# Patient Record
Sex: FEMALE | Race: OTHER | HISPANIC OR LATINO | Employment: FULL TIME | URBAN - METROPOLITAN AREA
[De-identification: names, ages, dates, MRNs, and addresses within clinical notes are randomized per-mention and may not be internally consistent; named-entity substitution may affect disease eponyms.]

---

## 2023-05-02 ENCOUNTER — APPOINTMENT (OUTPATIENT)
Dept: LAB | Facility: CLINIC | Age: 28
End: 2023-05-02

## 2023-05-02 DIAGNOSIS — Z02.1 PRE-EMPLOYMENT HEALTH SCREENING EXAMINATION: ICD-10-CM

## 2023-05-02 LAB
MEV IGG SER QL IA: NORMAL
MUV IGG SER QL IA: NORMAL
RUBV IGG SERPL IA-ACNC: 121.1 IU/ML
VZV IGG SER QL IA: NORMAL

## 2023-05-03 LAB
GAMMA INTERFERON BACKGROUND BLD IA-ACNC: 0.05 IU/ML
M TB IFN-G BLD-IMP: NEGATIVE
M TB IFN-G CD4+ BCKGRND COR BLD-ACNC: -0.01 IU/ML
M TB IFN-G CD4+ BCKGRND COR BLD-ACNC: -0.01 IU/ML
MITOGEN IGNF BCKGRD COR BLD-ACNC: 4.98 IU/ML

## 2023-06-16 PROBLEM — Z00.00 ANNUAL PHYSICAL EXAM: Status: ACTIVE | Noted: 2023-06-16

## 2023-06-16 PROBLEM — E01.0 THYROMEGALY: Status: ACTIVE | Noted: 2023-06-16

## 2023-06-16 PROBLEM — R94.6 ABNORMAL THYROID EXAM: Status: ACTIVE | Noted: 2023-06-16

## 2023-06-16 PROBLEM — I83.93 VARICOSE VEINS OF BOTH LOWER EXTREMITIES: Status: ACTIVE | Noted: 2023-06-16

## 2023-06-16 PROBLEM — E28.2 POLYCYSTIC OVARY: Status: ACTIVE | Noted: 2023-06-16

## 2023-06-16 PROBLEM — E66.811 CLASS 1 OBESITY DUE TO EXCESS CALORIES WITHOUT SERIOUS COMORBIDITY WITH BODY MASS INDEX (BMI) OF 32.0 TO 32.9 IN ADULT: Status: ACTIVE | Noted: 2023-06-16

## 2023-06-16 PROBLEM — E66.09 CLASS 1 OBESITY DUE TO EXCESS CALORIES WITHOUT SERIOUS COMORBIDITY WITH BODY MASS INDEX (BMI) OF 32.0 TO 32.9 IN ADULT: Status: ACTIVE | Noted: 2023-06-16

## 2023-06-16 PROBLEM — I83.813 VARICOSE VEINS OF BOTH LOWER EXTREMITIES WITH PAIN: Status: ACTIVE | Noted: 2023-06-16

## 2023-06-16 PROBLEM — J06.9 ACUTE URI: Status: ACTIVE | Noted: 2023-06-16

## 2023-08-15 PROBLEM — J06.9 ACUTE URI: Status: RESOLVED | Noted: 2023-06-16 | Resolved: 2023-08-15

## 2023-12-31 ENCOUNTER — APPOINTMENT (EMERGENCY)
Dept: RADIOLOGY | Facility: HOSPITAL | Age: 28
End: 2023-12-31
Payer: COMMERCIAL

## 2023-12-31 ENCOUNTER — HOSPITAL ENCOUNTER (EMERGENCY)
Facility: HOSPITAL | Age: 28
Discharge: HOME | End: 2023-12-31
Attending: INTERNAL MEDICINE | Admitting: INTERNAL MEDICINE
Payer: COMMERCIAL

## 2023-12-31 VITALS
TEMPERATURE: 98.1 F | RESPIRATION RATE: 18 BRPM | SYSTOLIC BLOOD PRESSURE: 136 MMHG | OXYGEN SATURATION: 99 % | DIASTOLIC BLOOD PRESSURE: 59 MMHG | HEART RATE: 86 BPM

## 2023-12-31 DIAGNOSIS — R07.9 CHEST PAIN, UNSPECIFIED TYPE: Primary | ICD-10-CM

## 2023-12-31 DIAGNOSIS — F41.9 ANXIETY: ICD-10-CM

## 2023-12-31 LAB
ALBUMIN SERPL BCP-MCNC: 4.4 G/DL (ref 3.5–5)
ALP SERPL-CCNC: 77 U/L (ref 34–104)
ALT SERPL W P-5'-P-CCNC: 13 U/L (ref 7–52)
ANION GAP SERPL CALCULATED.3IONS-SCNC: 9 MMOL/L
AST SERPL W P-5'-P-CCNC: 14 U/L (ref 13–39)
ATRIAL RATE: 111 BPM
ATRIAL RATE: 80 BPM
BASOPHILS # BLD AUTO: 0.02 THOUSANDS/ÂΜL (ref 0–0.1)
BASOPHILS NFR BLD AUTO: 0 % (ref 0–1)
BILIRUB SERPL-MCNC: 0.73 MG/DL (ref 0.2–1)
BUN SERPL-MCNC: 12 MG/DL (ref 5–25)
CALCIUM SERPL-MCNC: 9.1 MG/DL (ref 8.4–10.2)
CARDIAC TROPONIN I PNL SERPL HS: <2 NG/L (ref 8–18)
CHLORIDE SERPL-SCNC: 103 MMOL/L (ref 96–108)
CO2 SERPL-SCNC: 25 MMOL/L (ref 21–32)
CREAT SERPL-MCNC: 0.74 MG/DL (ref 0.6–1.3)
EOSINOPHIL # BLD AUTO: 0.07 THOUSAND/ÂΜL (ref 0–0.61)
EOSINOPHIL NFR BLD AUTO: 1 % (ref 0–6)
ERYTHROCYTE [DISTWIDTH] IN BLOOD BY AUTOMATED COUNT: 12.8 % (ref 11.6–15.1)
GFR SERPL CREATININE-BSD FRML MDRD: 110 ML/MIN/1.73SQ M
GLUCOSE SERPL-MCNC: 81 MG/DL (ref 65–140)
HCT VFR BLD AUTO: 45.4 % (ref 34.8–46.1)
HGB BLD-MCNC: 15.1 G/DL (ref 11.5–15.4)
IMM GRANULOCYTES # BLD AUTO: 0.07 THOUSAND/UL (ref 0–0.2)
IMM GRANULOCYTES NFR BLD AUTO: 1 % (ref 0–2)
LYMPHOCYTES # BLD AUTO: 1.49 THOUSANDS/ÂΜL (ref 0.6–4.47)
LYMPHOCYTES NFR BLD AUTO: 15 % (ref 14–44)
MCH RBC QN AUTO: 28.1 PG (ref 26.8–34.3)
MCHC RBC AUTO-ENTMCNC: 33.3 G/DL (ref 31.4–37.4)
MCV RBC AUTO: 85 FL (ref 82–98)
MONOCYTES # BLD AUTO: 0.93 THOUSAND/ÂΜL (ref 0.17–1.22)
MONOCYTES NFR BLD AUTO: 9 % (ref 4–12)
NEUTROPHILS # BLD AUTO: 7.7 THOUSANDS/ÂΜL (ref 1.85–7.62)
NEUTS SEG NFR BLD AUTO: 74 % (ref 43–75)
NRBC BLD AUTO-RTO: 0 /100 WBCS
P AXIS: 78 DEGREES
P AXIS: 80 DEGREES
PLATELET # BLD AUTO: 329 THOUSANDS/UL (ref 149–390)
PMV BLD AUTO: 9.3 FL (ref 8.9–12.7)
POTASSIUM SERPL-SCNC: 3.2 MMOL/L (ref 3.5–5.3)
PR INTERVAL: 122 MS
PR INTERVAL: 124 MS
PROT SERPL-MCNC: 7.4 G/DL (ref 6.4–8.4)
QRS AXIS: 86 DEGREES
QRS AXIS: 90 DEGREES
QRSD INTERVAL: 84 MS
QRSD INTERVAL: 88 MS
QT INTERVAL: 302 MS
QT INTERVAL: 352 MS
QTC INTERVAL: 405 MS
QTC INTERVAL: 410 MS
RBC # BLD AUTO: 5.37 MILLION/UL (ref 3.81–5.12)
SODIUM SERPL-SCNC: 137 MMOL/L (ref 135–147)
T WAVE AXIS: 31 DEGREES
T WAVE AXIS: 65 DEGREES
T4 FREE SERPL-MCNC: 0.91 NG/DL (ref 0.61–1.12)
TSH SERPL DL<=0.05 MIU/L-ACNC: 1.05 UIU/ML (ref 0.45–4.5)
VENTRICULAR RATE: 111 BPM
VENTRICULAR RATE: 80 BPM
WBC # BLD AUTO: 10.28 THOUSAND/UL (ref 4.31–10.16)

## 2023-12-31 PROCEDURE — 84439 ASSAY OF FREE THYROXINE: CPT | Performed by: INTERNAL MEDICINE

## 2023-12-31 PROCEDURE — 80053 COMPREHEN METABOLIC PANEL: CPT | Performed by: INTERNAL MEDICINE

## 2023-12-31 PROCEDURE — 99285 EMERGENCY DEPT VISIT HI MDM: CPT | Performed by: INTERNAL MEDICINE

## 2023-12-31 PROCEDURE — 99285 EMERGENCY DEPT VISIT HI MDM: CPT

## 2023-12-31 PROCEDURE — 84443 ASSAY THYROID STIM HORMONE: CPT | Performed by: INTERNAL MEDICINE

## 2023-12-31 PROCEDURE — 85025 COMPLETE CBC W/AUTO DIFF WBC: CPT | Performed by: INTERNAL MEDICINE

## 2023-12-31 PROCEDURE — 84484 ASSAY OF TROPONIN QUANT: CPT | Performed by: INTERNAL MEDICINE

## 2023-12-31 PROCEDURE — 36415 COLL VENOUS BLD VENIPUNCTURE: CPT | Performed by: INTERNAL MEDICINE

## 2023-12-31 PROCEDURE — 71045 X-RAY EXAM CHEST 1 VIEW: CPT

## 2023-12-31 PROCEDURE — 93005 ELECTROCARDIOGRAM TRACING: CPT

## 2023-12-31 RX ORDER — POTASSIUM CHLORIDE 20 MEQ/1
40 TABLET, EXTENDED RELEASE ORAL ONCE
Status: COMPLETED | OUTPATIENT
Start: 2023-12-31 | End: 2023-12-31

## 2023-12-31 RX ORDER — ALPRAZOLAM 0.5 MG/1
0.5 TABLET ORAL ONCE
Status: COMPLETED | OUTPATIENT
Start: 2023-12-31 | End: 2023-12-31

## 2023-12-31 RX ADMIN — POTASSIUM CHLORIDE 40 MEQ: 1500 TABLET, EXTENDED RELEASE ORAL at 10:08

## 2023-12-31 RX ADMIN — ALPRAZOLAM 0.5 MG: 0.5 TABLET ORAL at 10:08

## 2023-12-31 NOTE — DISCHARGE INSTRUCTIONS
Follow up with [facility].  Labs Reviewed   CBC AND DIFFERENTIAL - Abnormal       Result Value Ref Range Status    WBC 10.28 (*) 4.31 - 10.16 Thousand/uL Final    RBC 5.37 (*) 3.81 - 5.12 Million/uL Final    Hemoglobin 15.1  11.5 - 15.4 g/dL Final    Hematocrit 45.4  34.8 - 46.1 % Final    MCV 85  82 - 98 fL Final    MCH 28.1  26.8 - 34.3 pg Final    MCHC 33.3  31.4 - 37.4 g/dL Final    RDW 12.8  11.6 - 15.1 % Final    MPV 9.3  8.9 - 12.7 fL Final    Platelets 329  149 - 390 Thousands/uL Final    nRBC 0  /100 WBCs Final    Neutrophils Relative 74  43 - 75 % Final    Immat GRANS % 1  0 - 2 % Final    Lymphocytes Relative 15  14 - 44 % Final    Monocytes Relative 9  4 - 12 % Final    Eosinophils Relative 1  0 - 6 % Final    Basophils Relative 0  0 - 1 % Final    Neutrophils Absolute 7.70 (*) 1.85 - 7.62 Thousands/µL Final    Immature Grans Absolute 0.07  0.00 - 0.20 Thousand/uL Final    Lymphocytes Absolute 1.49  0.60 - 4.47 Thousands/µL Final    Monocytes Absolute 0.93  0.17 - 1.22 Thousand/µL Final    Eosinophils Absolute 0.07  0.00 - 0.61 Thousand/µL Final    Basophils Absolute 0.02  0.00 - 0.10 Thousands/µL Final   COMPREHENSIVE METABOLIC PANEL - Abnormal    Sodium 137  135 - 147 mmol/L Final    Potassium 3.2 (*) 3.5 - 5.3 mmol/L Final    Chloride 103  96 - 108 mmol/L Final    CO2 25  21 - 32 mmol/L Final    ANION GAP 9  mmol/L Final    BUN 12  5 - 25 mg/dL Final    Creatinine 0.74  0.60 - 1.30 mg/dL Final    Comment: Standardized to IDMS reference method    Glucose 81  65 - 140 mg/dL Final    Comment: If the patient is fasting, the ADA then defines impaired fasting glucose as > 100 mg/dL and diabetes as > or equal to 123 mg/dL.    Calcium 9.1  8.4 - 10.2 mg/dL Final    AST 14  13 - 39 U/L Final    ALT 13  7 - 52 U/L Final    Comment: Specimen collection should occur prior to Sulfasalazine administration due to the potential for falsely depressed results.     Alkaline Phosphatase 77  34 - 104 U/L  Final    Total Protein 7.4  6.4 - 8.4 g/dL Final    Albumin 4.4  3.5 - 5.0 g/dL Final    Total Bilirubin 0.73  0.20 - 1.00 mg/dL Final    Comment: Use of this assay is not recommended for patients undergoing treatment with eltrombopag due to the potential for falsely elevated results.  N-acetyl-p-benzoquinone imine (metabolite of Acetaminophen) will generate erroneously low results in samples for patients that have taken an overdose of Acetaminophen.    eGFR 110  ml/min/1.73sq m Final    Narrative:     National Kidney Disease Foundation guidelines for Chronic Kidney Disease (CKD):     Stage 1 with normal or high GFR (GFR > 90 mL/min/1.73 square meters)    Stage 2 Mild CKD (GFR = 60-89 mL/min/1.73 square meters)    Stage 3A Moderate CKD (GFR = 45-59 mL/min/1.73 square meters)    Stage 3B Moderate CKD (GFR = 30-44 mL/min/1.73 square meters)    Stage 4 Severe CKD (GFR = 15-29 mL/min/1.73 square meters)    Stage 5 End Stage CKD (GFR <15 mL/min/1.73 square meters)  Note: GFR calculation is accurate only with a steady state creatinine   HIGH SENSITIVITY TROPONIN I RANDOM - Abnormal    HS TnI random <2 (*) 8 - 18 ng/L Final    Comment:                                              Initial (time 0) result  If >=50 ng/L, Myocardial injury suggested ;  Type of myocardial injury and treatment strategy  to be determined.  If 5-49 ng/L, a delta result at 2 hours and or 4 hours will be needed to further evaluate.  If <4 ng/L, and chest pain has been >3 hours since onset, patient may qualify for discharge based on the HEART score in the ED.  If <5 ng/L and <3hours since onset of chest pain, a delta result at 2 hours will be needed to further evaluate.    HS Troponin 99th Percentile URL of a Health Population=12 ng/L with a 95% Confidence Interval of 8-18 ng/L.    Second Troponin (time 2 hours)  If calculated delta >= 20 ng/L,  Myocardial injury suggested ; Type of myocardial injury and treatment strategy to be determined.  If 5-49  ng/L and the calculated delta is 5-19 ng/L, consult medical service for evaluation.  Continue evaluation for ischemia on ecg and other possible etiology and repeat hs troponin at 4 hours.  If delta is <5 ng/L at 2 hours, consider discharge based on risk stratification via the HEART score (if in ED), or HANSEL risk score in IP/Observation.    HS Troponin 99th Percentile URL of a Health Population=12 ng/L with a 95% Confidence Interval of 8-18 ng/L.   TSH, 3RD GENERATION - Normal    TSH 3RD GENERATON 1.049  0.450 - 4.500 uIU/mL Final    Comment: The recommended reference ranges for TSH during pregnancy are as follows:   First trimester 0.100 to 2.500 uIU/mL   Second trimester  0.200 to 3.000 uIU/mL   Third trimester 0.300 to 3.000 uIU/m    Note: Normal ranges may not apply to patients who are transgender, non-binary, or whose legal sex, sex at birth, and gender identity differ.  Adult TSH (3rd generation) reference range follows the recommended guidelines of the American Thyroid Association, January, 2020.   T4, FREE     XR chest portable   ED Interpretation   CXR; NO acute cardiopulmonary findings

## 2023-12-31 NOTE — ED PROVIDER NOTES
History  Chief Complaint   Patient presents with    Chest Pain     Pt reports chest pain and back pain starting few days ago, denies cardiac or resp hx.      This is a 28 years old came for having chest pain.  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  Patient stated that the pain increases when she takes deep breaths or when she pressed on it.  Patient stated that she has this chest pain for almost 8 days.  Patient denies any nausea vomiting.  Patient denies being pregnant and she has IUD.  Patient stated that she is anxious and she needed something to calm her down.  Patient was [place] that she has anxiety and panic attack.  Patient does not take any medication.  Patient on the monitor she is tachycardic.  Patient stated that most of his family having history of hypothyroidism.  Patient went to a specialist in [facility] who told her that she has hypothyroidism.  Patient does not take any medication for it.  Patient also consulted with endocrinologist in [place] and he told her that he does not think that she has hypothyroidism and he ordered blood work and imaging but patient never went.  Patient denies losing weight.  Patient is anxious at the ER.  Patient has no medical history and she is not a smoker.        None       History reviewed. No pertinent past medical history.    History reviewed. No pertinent surgical history.    History reviewed. No pertinent family history.  I have reviewed and agree with the history as documented.    E-Cigarette/Vaping     E-Cigarette/Vaping Substances     Social History     Tobacco Use    Smoking status: Never    Smokeless tobacco: Never   Substance Use Topics    Alcohol use: Never    Drug use: Never       Review of Systems   Constitutional:  Negative for fatigue and fever.   HENT:  Negative for congestion, sinus pressure, sinus pain, sneezing, sore throat and tinnitus.    Respiratory:  Negative for cough and shortness of  breath.    Cardiovascular:  Positive for chest pain. Negative for palpitations.   Gastrointestinal:  Negative for abdominal pain, diarrhea, nausea and vomiting.   Genitourinary:  Negative for difficulty urinating, dysuria, flank pain and frequency.   Musculoskeletal:  Negative for back pain, myalgias, neck pain and neck stiffness.   Skin:  Negative for color change, pallor and rash.   Neurological:  Negative for dizziness, light-headedness and headaches.   Psychiatric/Behavioral:  Negative for agitation and behavioral problems.        Physical Exam  Physical Exam  Vitals and nursing note reviewed.   Constitutional:       General: She is not in acute distress.     Appearance: She is well-developed. She is not ill-appearing, toxic-appearing or diaphoretic.   HENT:      Head: Normocephalic and atraumatic.      Mouth/Throat:      Pharynx: No oropharyngeal exudate.   Eyes:      Extraocular Movements: Extraocular movements intact.      Pupils: Pupils are equal, round, and reactive to light.   Neck:      Thyroid: Thyromegaly present.      Vascular: No hepatojugular reflux or JVD.      Trachea: No tracheal deviation.   Cardiovascular:      Rate and Rhythm: Normal rate and regular rhythm.      Pulses:           Carotid pulses are 2+ on the right side and 2+ on the left side.       Radial pulses are 2+ on the right side and 2+ on the left side.        Dorsalis pedis pulses are 2+ on the right side and 2+ on the left side.        Posterior tibial pulses are 2+ on the right side and 2+ on the left side.      Heart sounds: Normal heart sounds. Heart sounds not distant. No murmur heard.     No systolic murmur is present.      No friction rub. No gallop. No S3 or S4 sounds.   Pulmonary:      Effort: Pulmonary effort is normal. No tachypnea, accessory muscle usage or respiratory distress.      Breath sounds: Normal breath sounds. No stridor. No decreased breath sounds, wheezing, rhonchi or rales.   Chest:      Chest wall: No mass,  deformity, tenderness, crepitus or edema. There is no dullness to percussion.   Abdominal:      General: Bowel sounds are normal. There is no abdominal bruit.      Palpations: Abdomen is soft. There is no fluid wave, hepatomegaly, splenomegaly or mass.      Tenderness: There is no abdominal tenderness. There is no guarding or rebound.   Musculoskeletal:         General: No tenderness or deformity. Normal range of motion.      Cervical back: Normal range of motion and neck supple.      Right lower leg: No tenderness. No edema.      Left lower leg: No tenderness. No edema.   Lymphadenopathy:      Cervical: No cervical adenopathy.   Skin:     General: Skin is warm and dry.      Capillary Refill: Capillary refill takes less than 2 seconds.      Coloration: Skin is not cyanotic or pale.      Findings: No ecchymosis, erythema or rash.      Nails: There is no clubbing.   Neurological:      Mental Status: She is alert and oriented to person, place, and time.   Psychiatric:         Behavior: Behavior normal.         Vital Signs  ED Triage Vitals   Temperature Pulse Respirations Blood Pressure SpO2   12/31/23 0817 12/31/23 1030 12/31/23 0816 12/31/23 0817 12/31/23 1030   98.1 °F (36.7 °C) 86 16 140/68 99 %      Temp Source Heart Rate Source Patient Position - Orthostatic VS BP Location FiO2 (%)   12/31/23 0817 12/31/23 1030 12/31/23 1030 12/31/23 1030 --   Oral Monitor Sitting Right arm       Pain Score       12/31/23 1030       1           Vitals:    12/31/23 0817 12/31/23 1030   BP: 140/68 136/59   Pulse:  86   Patient Position - Orthostatic VS:  Sitting         Visual Acuity      ED Medications  Medications   ALPRAZolam (XANAX) tablet 0.5 mg (0.5 mg Oral Given 12/31/23 1008)   potassium chloride (K-DUR,KLOR-CON) CR tablet 40 mEq (40 mEq Oral Given 12/31/23 1008)       Diagnostic Studies  Results Reviewed       Procedure Component Value Units Date/Time    T4, free [[id number]]  (Normal) Collected: 12/31/23 0854    Lab  "Status: Final result Specimen: Blood from Arm, Left Updated: 12/31/23 1627     Free T4 0.91 ng/dL     Narrative:        \"Therapeutic range for patients medicated with thyroid disorders: 0.61-1.24 ng/dL.\"    TSH [[id number]]  (Normal) Collected: 12/31/23 0854    Lab Status: Final result Specimen: Blood from Arm, Left Updated: 12/31/23 0939     TSH 3RD GENERATON 1.049 uIU/mL     High Sensitivity Troponin I Random [[id number]]  (Abnormal) Collected: 12/31/23 0854    Lab Status: Final result Specimen: Blood from Arm, Left Updated: 12/31/23 0931     HS TnI random <2 ng/L     Comprehensive metabolic panel [[id number]]  (Abnormal) Collected: 12/31/23 0854    Lab Status: Final result Specimen: Blood from Arm, Left Updated: 12/31/23 0923     Sodium 137 mmol/L      Potassium 3.2 mmol/L      Chloride 103 mmol/L      CO2 25 mmol/L      ANION GAP 9 mmol/L      BUN 12 mg/dL      Creatinine 0.74 mg/dL      Glucose 81 mg/dL      Calcium 9.1 mg/dL      AST 14 U/L      ALT 13 U/L      Alkaline Phosphatase 77 U/L      Total Protein 7.4 g/dL      Albumin 4.4 g/dL      Total Bilirubin 0.73 mg/dL      eGFR 110 ml/min/1.73sq m     Narrative:      National Kidney Disease Foundation guidelines for Chronic Kidney Disease (CKD):     Stage 1 with normal or high GFR (GFR > 90 mL/min/1.73 square meters)    Stage 2 Mild CKD (GFR = 60-89 mL/min/1.73 square meters)    Stage 3A Moderate CKD (GFR = 45-59 mL/min/1.73 square meters)    Stage 3B Moderate CKD (GFR = 30-44 mL/min/1.73 square meters)    Stage 4 Severe CKD (GFR = 15-29 mL/min/1.73 square meters)    Stage 5 End Stage CKD (GFR <15 mL/min/1.73 square meters)  Note: GFR calculation is accurate only with a steady state creatinine    CBC and differential [[id number]]  (Abnormal) Collected: 12/31/23 0854    Lab Status: Final result Specimen: Blood from Arm, Left Updated: 12/31/23 0902     WBC 10.28 Thousand/uL      RBC 5.37 Million/uL      Hemoglobin 15.1 g/dL      Hematocrit 45.4 %      MCV 85 fL     "  MCH 28.1 pg      MCHC 33.3 g/dL      RDW 12.8 %      MPV 9.3 fL      Platelets 329 Thousands/uL      nRBC 0 /100 WBCs      Neutrophils Relative 74 %      Immat GRANS % 1 %      Lymphocytes Relative 15 %      Monocytes Relative 9 %      Eosinophils Relative 1 %      Basophils Relative 0 %      Neutrophils Absolute 7.70 Thousands/µL      Immature Grans Absolute 0.07 Thousand/uL      Lymphocytes Absolute 1.49 Thousands/µL      Monocytes Absolute 0.93 Thousand/µL      Eosinophils Absolute 0.07 Thousand/µL      Basophils Absolute 0.02 Thousands/µL                    XR chest portable   ED Interpretation by Darius Johnson MD (12/31 0930)   CXR; NO acute cardiopulmonary findings      Final Result by Marco Antonio Sofia MD (01/01 0950)      No acute cardiopulmonary disease.                  Workstation performed: [id number]                    Procedures  ECG 12 Lead Documentation Only    Date/Time: 12/31/2023 9:12 AM    Performed by: Darius Johnson MD  Authorized by: Darius Johnson MD    Indications / Diagnosis:  Chest pain  ECG reviewed by me, the ED Provider: yes    Patient location:  ED and bedside  Previous ECG:     Previous ECG:  Unavailable  Interpretation:     Interpretation: normal    Rate:     ECG rate:  111    ECG rate assessment: tachycardic    Rhythm:     Rhythm: sinus tachycardia    Ectopy:     Ectopy: none    QRS:     QRS axis:  Normal    QRS intervals:  Normal  Conduction:     Conduction: normal    ST segments:     ST segments:  Abnormal    Elevation:  V3, V4, V5 and V6  T waves:     T waves: non-specific    Other findings:     Other findings: RUBEN    ECG 12 Lead Documentation Only    Date/Time: 12/31/2023 10:10 AM    Performed by: Darius Johnson MD  Authorized by: Darius Johnson MD    Indications / Diagnosis:  CHEST PAIN  ECG reviewed by me, the ED Provider: yes    Patient location:  ED  Previous ECG:     Previous ECG:  Compared to current    Similarity:  Changes noted    Comparison to  cardiac monitor: Yes    Interpretation:     Interpretation: abnormal    Rate:     ECG rate:  80    ECG rate assessment: normal    Rhythm:     Rhythm: sinus rhythm    Ectopy:     Ectopy: none    QRS:     QRS axis:  Normal    QRS intervals:  Normal  Conduction:     Conduction: normal    ST segments:     ST segments:  Normal  T waves:     T waves: normal    Comments:      NORMAL EKG           ED Course             HEART Risk Score      Flowsheet Row Most Recent Value   Heart Score Risk Calculator    History 0 Filed at: 12/31/2023 1014   ECG 0 Filed at: 12/31/2023 1014   Age 0 Filed at: 12/31/2023 1014   Risk Factors 0 Filed at: 12/31/2023 1014   Troponin 0 Filed at: 12/31/2023 1014   HEART Score 0 Filed at: 12/31/2023 1014                                        Medical Decision Making  This is a 28 years old came for having chest pain.  Patient has this pain for almost 3 days.  Today the pain increased while she was walking.  Patient has no SOB, nausea vomiting diaphoresis.  Patient has no history of CAD.  Patient stated that she felt anxious and she needed to calm down.  Patient has family history of hypothyroidism and at the ER patient is tachycardic.  Patient was told by endocrinologist to do blood work and imaging studies but she never went.  Patient denies smoking.  Patient denies taking any medications , now.  Patient was on metoprolol for sometimes but she stopped it as per  Endocrinologist.  Patient stated that she has history of anxiety and panic attack in the past.  Physical exam is pertinent for having enlarged thyroid.  No other pertinent findings on the physical exam.  EKG shows sinus tachycardia with rate 111 and EEG, ST depression in V3, 4, 5, 6. Pt received xanax and felt better. Repeat EKG, NSR rate80/min, no ischemic changes, normal EKG. LABS; CBC WNL, CMP;  K 3.2 TSH 1.049.  heart score is 0.  Differential diagnoses include but not limited to; anxiety, panic attack, costochondritis, pleurisy,  pericarditis, angina,  .     Amount and/or Complexity of Data Reviewed  Labs: ordered.     Details: CBC;WNL, CMP  K 3.2 , TSH 1.049  Radiology: ordered and independent interpretation performed.     Details: CXR; no acute cardiopulmonary findings.  ECG/medicine tests: ordered and independent interpretation performed.     Details: EKG; sinus tachycardia rate 111/min, RAD, ST depression in V3, 4, 5, 6.  Repeat EKG; NSR rate 80/min, no ischemic changes, normal EKG    Risk  Prescription drug management.             Disposition  Final diagnoses:   Chest pain, unspecified type   Anxiety     Time reflects when diagnosis was documented in both MDM as applicable and the Disposition within this note       Time User Action Codes Description Comment    12/31/2023 10:16 AM Darius Johnson [R07.9] Chest pain, unspecified type     12/31/2023 10:16 AM Darius Johnson [F41.9] Anxiety           ED Disposition       ED Disposition   Discharge    Condition   Stable    Date/Time   Sun Dec 31, 2023 1016    Comment   Deepa Darling discharge to home/self care.                   Follow-up Information       Follow up With Specialties Details Why Contact Info Additional Information    [facility] In 1 week  [address] [facility]  [address]  [facility] [place] [address] [phone] [facility], [address], [place], [address], [phone]            There are no discharge medications for this patient.      No discharge procedures on file.    PDMP Review       None            ED Provider  Electronically Signed by             Darius Johnson MD  01/01/24 [id number]

## 2024-03-01 ENCOUNTER — OFFICE VISIT (OUTPATIENT)
Dept: URGENT CARE | Facility: CLINIC | Age: 29
End: 2024-03-01
Payer: COMMERCIAL

## 2024-03-01 VITALS
SYSTOLIC BLOOD PRESSURE: 129 MMHG | HEIGHT: 65 IN | WEIGHT: 187.39 LBS | OXYGEN SATURATION: 99 % | HEART RATE: 83 BPM | BODY MASS INDEX: 31.22 KG/M2 | TEMPERATURE: 96.2 F | RESPIRATION RATE: 12 BRPM | DIASTOLIC BLOOD PRESSURE: 80 MMHG

## 2024-03-01 DIAGNOSIS — M79.672 LEFT FOOT PAIN: Primary | ICD-10-CM

## 2024-03-01 PROCEDURE — 99213 OFFICE O/P EST LOW 20 MIN: CPT | Performed by: NURSE PRACTITIONER

## 2024-03-01 NOTE — LETTER
March 1, 2024     Patient: Deepa Darling   YOB: 1995   Date of Visit: 3/1/2024       To Whom it May Concern:    Deepa Darling was seen in my clinic on 3/1/2024. She may return to work on 3/2/2024 .    If you have any questions or concerns, please don't hesitate to call.         Sincerely,          KVNG Jacinto        CC: No Recipients

## 2024-03-01 NOTE — PROGRESS NOTES
[facility]        NAME: Deepa Darling is a 28 y.o. female  : 1995    MRN: [id number]  DATE: 2024  TIME: 5:05 PM    Assessment and Plan   Left foot pain [M79.672]  1. Left foot pain              Patient Instructions   If tests have been performed at [facility], our office will contact you with results if changes need to be made to the care plan discussed with you at the visit. You can review your full results on [facility] MyChart.     Ibuprofen as needed for pain   Apply ice 20 min every 2-3 hours  Wear a good supportive shoe     Follow up with PCP in 3-5 days.  Proceed to  ER if symptoms worsen.    Chief Complaint     Chief Complaint   Patient presents with    Foot Pain     Pt reports of right foot pain with out injury occurred yesterday.          History of Present Illness       Patient is a 28 year old female presenting with left foot pain that started yesterday. She states that she wore new shoes to work yesterday when the pain started. Denies injury. Denies swelling, ecchymosis, or erythema. She took Tylenol yesterday.         Review of Systems   Review of Systems   Constitutional:  Negative for activity change, chills and fever.   Respiratory:  Negative for cough and shortness of breath.    Musculoskeletal:  Negative for arthralgias and gait problem.   Skin:  Negative for color change and wound.   Neurological:  Negative for headaches.         Current Medications     No current outpatient medications on file.    Current Allergies     Allergies as of 2024    (No Known Allergies)            The following portions of the patient's history were reviewed and updated as appropriate: allergies, current medications, past family history, past medical history, past social history, past surgical history and problem list.     Past Medical History:   Diagnosis Date    Hypothyroidism        Past Surgical History:   Procedure Laterality Date    WISDOM TOOTH EXTRACTION         Family  "History   Problem Relation Age of Onset    Thyroid disease Mother     Hypertension Father     Hypertension Sister     Heart attack Maternal Grandfather     Thyroid cancer Paternal Grandmother     Cancer Paternal Grandfather         Cancer de prostata    Heart attack Maternal Uncle     Thyroid cancer Paternal Aunt          Medications have been verified.        Objective   /80   Pulse 83   Temp (!) 96.2 °F (35.7 °C)   Resp 12   Ht 5' 5\" (1.651 m)   Wt 85 kg (187 lb 6.3 oz)   SpO2 99%   BMI 31.18 kg/m²        Physical Exam     Physical Exam  Constitutional:       General: She is awake. She is not in acute distress.     Appearance: Normal appearance. She is normal weight.   Cardiovascular:      Rate and Rhythm: Normal rate.   Pulmonary:      Effort: Pulmonary effort is normal.   Musculoskeletal:        Feet:    Neurological:      Mental Status: She is alert.   Psychiatric:         Behavior: Behavior is cooperative.                   "

## 2024-03-01 NOTE — PATIENT INSTRUCTIONS
If tests have been performed at [facility], our office will contact you with results if changes need to be made to the care plan discussed with you at the visit. You can review your full results on [facility] MyChart.     Ibuprofen as needed for pain   Apply ice 20 min every 2-3 hours  Wear a good supportive shoe

## 2024-03-08 PROBLEM — Z01.419 ENCOUNTER FOR ANNUAL ROUTINE GYNECOLOGICAL EXAMINATION: Status: ACTIVE | Noted: 2024-03-08

## 2024-03-08 NOTE — PROGRESS NOTES
Assessment/Plan:  NGE  BCM-Liletta         Pap smear q 3yrs. - done  Cervical Cultures till 25  - done,  along with desired blood work                                                                         RTO 1 yr.                                                                                    SBA monthly                                                                              Exercise 3/ wk                                                                                        Calcium 1,000 mg/d with Vit D                    Depression Screen: Neg                                      Diagnoses and all orders for this visit:    Encounter for annual routine gynecological examination  -     Liquid-based pap, screening    Screening for cervical cancer    Screen for STD (sexually transmitted disease)  -     Chlamydia/GC amplified DNA by PCR  -     Hepatitis B surface antigen; Future  -     Hepatitis C antibody  -     HIV 1/2 AG/AB w Reflex [facility] for 2 yr old and above; Future  -     RPR-Syphilis Screening (Total Syphilis IGG/IGM); Future    IUD (intrauterine device) in place    Other orders  -     levonorgestrel (Liletta, 52 MG,) 20.1 mcg/day IUD; 1 each by Intrauterine route Once every 8 years              Subjective:        Patient ID: Deepa Darling is a 29 y.o. female.    Sandy presents today for a new patient annual visit.  She is without complaints.  She enjoys amenorrhea on Liletta.  She arrived to the [place] in 2021 from [facility].  She is  but they are coexisting until her asylum case is finished.  She plans on .  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        The following portions of the patient's history were reviewed and updated as appropriate: She  has a past medical history of Hypothyroidism.  Patient Active Problem List    Diagnosis Date Noted    IUD (intrauterine device) in place 03/11/2024     Encounter for annual routine gynecological examination 03/08/2024    Annual physical exam 06/16/2023    Varicose veins of both lower extremities with pain 06/16/2023    Class 1 obesity due to excess calories without serious comorbidity with body mass index (BMI) of 32.0 to 32.9 in adult 06/16/2023    Polycystic ovary 06/16/2023    Abnormal thyroid exam 06/16/2023    Thyromegaly 06/16/2023   PMH:  Menarche 11  Dysmenorrhea  Hyperinsulinism 15  G1, P1; SAVD M 2700 gms. complicated by hyperemesis gravidarum  Mirena 5/17  Liletta 4/23  She  has a past surgical history that includes [place] tooth extraction.  Her family history includes Cancer in her paternal grandfather; Heart attack in her maternal grandfather and maternal uncle; Hypertension in her father and sister; Thyroid cancer in her paternal aunt and paternal grandmother; Thyroid disease in her mother.  MGF, MU - MI  F, S - HTN  M, MGM, MA -hypothyroidism  She  reports that she has never smoked. She has never used smokeless tobacco. She reports that she does not drink alcohol and does not use drugs.  SH:   '17.  Moved from [facility] '21.  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  Desires to go to nursing school.  Current Outpatient Medications   Medication Sig Dispense Refill    levonorgestrel (Liletta, 52 MG,) 20.1 mcg/day IUD 1 each by Intrauterine route Once every 8 years       No current facility-administered medications for this visit.     Current Outpatient Medications on File Prior to Visit   Medication Sig    levonorgestrel (Liletta, 52 MG,) 20.1 mcg/day IUD 1 each by Intrauterine route Once every 8 years     No current facility-administered medications on file prior to visit.     She has No Known Allergies..    Review of Systems   Constitutional:  Negative for activity change, appetite change, fatigue and unexpected weight change.   Eyes:  Negative for visual disturbance.   Respiratory:  Negative for cough, chest  "tightness, shortness of breath and wheezing.    Cardiovascular:  Negative for chest pain, palpitations and leg swelling.        Breast: Patient denies tenderness, nipple discharge, masses, or erythema.   Gastrointestinal:  Negative for abdominal distention, abdominal pain, blood in stool, constipation, diarrhea, nausea and vomiting.   Endocrine: Negative for cold intolerance and heat intolerance.   Genitourinary:  Negative for decreased urine volume, difficulty urinating, dysuria, frequency, hematuria, menstrual problem, pelvic pain, urgency, vaginal bleeding, vaginal discharge and vaginal pain.        No incontinence.  Not sexually active   Musculoskeletal:  Negative for arthralgias.   Skin:  Negative for rash.   Neurological:  Negative for weakness, light-headedness, numbness and headaches.   Hematological:  Does not bruise/bleed easily.   Psychiatric/Behavioral:  Negative for agitation, behavioral problems and sleep disturbance. The patient is not nervous/anxious.          Objective:    Vitals:    03/11/24 1418   BP: 110/80   BP Location: Right arm   Patient Position: Sitting   Cuff Size: Standard   Weight: 88.1 kg (194 lb 3.2 oz)   Height: 5' 2.99\" (1.6 m)            Physical Exam  Vitals and nursing note reviewed. Exam conducted with a chaperone present.   Constitutional:       General: She is not in acute distress.     Appearance: Normal appearance. She is well-developed.   HENT:      Head: Normocephalic and atraumatic.   Eyes:      General: No scleral icterus.        Right eye: No discharge.         Left eye: No discharge.      Extraocular Movements: Extraocular movements intact.      Conjunctiva/sclera: Conjunctivae normal.   Neck:      Thyroid: No thyromegaly.      Trachea: No tracheal deviation.   Cardiovascular:      Rate and Rhythm: Normal rate and regular rhythm.      Heart sounds: Normal heart sounds. No murmur heard.  Pulmonary:      Effort: Pulmonary effort is normal. No respiratory distress.      " Breath sounds: Normal breath sounds. No wheezing.   Chest:   Breasts:     Breasts are symmetrical.      Right: No inverted nipple, mass, nipple discharge, skin change or tenderness.      Left: No inverted nipple, mass, nipple discharge, skin change or tenderness.   Abdominal:      General: Bowel sounds are normal. There is no distension.      Palpations: Abdomen is soft. There is no mass.      Tenderness: There is no abdominal tenderness. There is no guarding or rebound.   Genitourinary:     General: Normal vulva.      Labia:         Right: No rash, tenderness or lesion.         Left: No rash, tenderness or lesion.       Vagina: Normal.      Cervix: No cervical motion tenderness or discharge.      Uterus: Not deviated, not enlarged and not tender.       Adnexa:         Right: No mass, tenderness or fullness.          Left: No mass, tenderness or fullness.        Rectum: No external hemorrhoid.      Comments: Urethral meatus within normal limits.  Perineum within normal limits.  Bladder well supported.  IUD string present.  Musculoskeletal:         General: No tenderness. Normal range of motion.      Cervical back: Normal range of motion and neck supple.   Lymphadenopathy:      Cervical: No cervical adenopathy.   Skin:     General: Skin is warm and dry.   Neurological:      Mental Status: She is alert and oriented to person, place, and time.   Psychiatric:         Mood and Affect: Mood normal.         Behavior: Behavior normal.         Thought Content: Thought content normal.         Judgment: Judgment normal.

## 2024-03-11 ENCOUNTER — OFFICE VISIT (OUTPATIENT)
Dept: OBGYN CLINIC | Facility: CLINIC | Age: 29
End: 2024-03-11
Payer: COMMERCIAL

## 2024-03-11 VITALS
BODY MASS INDEX: 34.41 KG/M2 | DIASTOLIC BLOOD PRESSURE: 80 MMHG | HEIGHT: 63 IN | SYSTOLIC BLOOD PRESSURE: 110 MMHG | WEIGHT: 194.2 LBS

## 2024-03-11 DIAGNOSIS — Z12.4 SCREENING FOR CERVICAL CANCER: ICD-10-CM

## 2024-03-11 DIAGNOSIS — Z11.3 SCREEN FOR STD (SEXUALLY TRANSMITTED DISEASE): ICD-10-CM

## 2024-03-11 DIAGNOSIS — Z01.419 ENCOUNTER FOR ANNUAL ROUTINE GYNECOLOGICAL EXAMINATION: Primary | ICD-10-CM

## 2024-03-11 DIAGNOSIS — Z97.5 IUD (INTRAUTERINE DEVICE) IN PLACE: ICD-10-CM

## 2024-03-11 PROCEDURE — 87491 CHLMYD TRACH DNA AMP PROBE: CPT | Performed by: OBSTETRICS & GYNECOLOGY

## 2024-03-11 PROCEDURE — 87591 N.GONORRHOEAE DNA AMP PROB: CPT | Performed by: OBSTETRICS & GYNECOLOGY

## 2024-03-11 PROCEDURE — S0610 ANNUAL GYNECOLOGICAL EXAMINA: HCPCS | Performed by: OBSTETRICS & GYNECOLOGY

## 2024-03-11 PROCEDURE — G0145 SCR C/V CYTO,THINLAYER,RESCR: HCPCS | Performed by: OBSTETRICS & GYNECOLOGY

## 2024-03-11 RX ORDER — LEVONORGESTREL 52 MG/1
1 INTRAUTERINE DEVICE INTRAUTERINE
COMMUNITY

## 2024-03-13 LAB
C TRACH DNA SPEC QL NAA+PROBE: NEGATIVE
N GONORRHOEA DNA SPEC QL NAA+PROBE: NEGATIVE

## 2024-03-16 ENCOUNTER — LAB (OUTPATIENT)
Dept: LAB | Facility: HOSPITAL | Age: 29
End: 2024-03-16
Payer: COMMERCIAL

## 2024-03-16 DIAGNOSIS — Z00.00 ANNUAL PHYSICAL EXAM: ICD-10-CM

## 2024-03-16 DIAGNOSIS — E28.2 POLYCYSTIC OVARY: ICD-10-CM

## 2024-03-16 DIAGNOSIS — Z11.3 SCREEN FOR STD (SEXUALLY TRANSMITTED DISEASE): ICD-10-CM

## 2024-03-16 DIAGNOSIS — R94.6 ABNORMAL THYROID EXAM: ICD-10-CM

## 2024-03-16 LAB
BASOPHILS # BLD AUTO: 0.05 THOUSANDS/ÂΜL (ref 0–0.1)
BASOPHILS NFR BLD AUTO: 1 % (ref 0–1)
EOSINOPHIL # BLD AUTO: 0.13 THOUSAND/ÂΜL (ref 0–0.61)
EOSINOPHIL NFR BLD AUTO: 2 % (ref 0–6)
ERYTHROCYTE [DISTWIDTH] IN BLOOD BY AUTOMATED COUNT: 13 % (ref 11.6–15.1)
HCT VFR BLD AUTO: 46.3 % (ref 34.8–46.1)
HGB BLD-MCNC: 14.6 G/DL (ref 11.5–15.4)
IMM GRANULOCYTES # BLD AUTO: 0.06 THOUSAND/UL (ref 0–0.2)
IMM GRANULOCYTES NFR BLD AUTO: 1 % (ref 0–2)
INSULIN SERPL-ACNC: 50.5 UIU/ML (ref 1.9–23)
LYMPHOCYTES # BLD AUTO: 1.43 THOUSANDS/ÂΜL (ref 0.6–4.47)
LYMPHOCYTES NFR BLD AUTO: 17 % (ref 14–44)
MCH RBC QN AUTO: 27.5 PG (ref 26.8–34.3)
MCHC RBC AUTO-ENTMCNC: 31.5 G/DL (ref 31.4–37.4)
MCV RBC AUTO: 87 FL (ref 82–98)
MONOCYTES # BLD AUTO: 0.67 THOUSAND/ÂΜL (ref 0.17–1.22)
MONOCYTES NFR BLD AUTO: 8 % (ref 4–12)
NEUTROPHILS # BLD AUTO: 5.96 THOUSANDS/ÂΜL (ref 1.85–7.62)
NEUTS SEG NFR BLD AUTO: 71 % (ref 43–75)
NRBC BLD AUTO-RTO: 0 /100 WBCS
PLATELET # BLD AUTO: 328 THOUSANDS/UL (ref 149–390)
PMV BLD AUTO: 9.6 FL (ref 8.9–12.7)
RBC # BLD AUTO: 5.31 MILLION/UL (ref 3.81–5.12)
T4 FREE SERPL-MCNC: 0.86 NG/DL (ref 0.61–1.12)
TSH SERPL DL<=0.05 MIU/L-ACNC: 1.02 UIU/ML (ref 0.45–4.5)
WBC # BLD AUTO: 8.3 THOUSAND/UL (ref 4.31–10.16)

## 2024-03-16 PROCEDURE — 87340 HEPATITIS B SURFACE AG IA: CPT

## 2024-03-16 PROCEDURE — 85025 COMPLETE CBC W/AUTO DIFF WBC: CPT

## 2024-03-16 PROCEDURE — 84439 ASSAY OF FREE THYROXINE: CPT

## 2024-03-16 PROCEDURE — 86780 TREPONEMA PALLIDUM: CPT

## 2024-03-16 PROCEDURE — 84443 ASSAY THYROID STIM HORMONE: CPT

## 2024-03-16 PROCEDURE — 83525 ASSAY OF INSULIN: CPT

## 2024-03-16 PROCEDURE — 86803 HEPATITIS C AB TEST: CPT | Performed by: OBSTETRICS & GYNECOLOGY

## 2024-03-16 PROCEDURE — 36415 COLL VENOUS BLD VENIPUNCTURE: CPT

## 2024-03-16 PROCEDURE — 87389 HIV-1 AG W/HIV-1&-2 AB AG IA: CPT

## 2024-03-17 LAB
HBV SURFACE AG SER QL: NORMAL
HCV AB SER QL: NORMAL
HIV 1+2 AB+HIV1 P24 AG SERPL QL IA: NORMAL
HIV 2 AB SERPL QL IA: NORMAL
HIV1 AB SERPL QL IA: NORMAL
HIV1 P24 AG SERPL QL IA: NORMAL
TREPONEMA PALLIDUM IGG+IGM AB [PRESENCE] IN SERUM OR PLASMA BY IMMUNOASSAY: NORMAL

## 2024-03-18 ENCOUNTER — TELEPHONE (OUTPATIENT)
Dept: OBGYN CLINIC | Facility: CLINIC | Age: 29
End: 2024-03-18

## 2024-03-18 LAB
LAB AP GYN PRIMARY INTERPRETATION: NORMAL
Lab: NORMAL
PATH INTERP SPEC-IMP: NORMAL

## 2024-03-19 ENCOUNTER — HOSPITAL ENCOUNTER (EMERGENCY)
Facility: HOSPITAL | Age: 29
Discharge: HOME | End: 2024-03-19
Attending: INTERNAL MEDICINE
Payer: OTHER MISCELLANEOUS

## 2024-03-19 VITALS
OXYGEN SATURATION: 98 % | RESPIRATION RATE: 17 BRPM | TEMPERATURE: 97.3 F | HEART RATE: 89 BPM | SYSTOLIC BLOOD PRESSURE: 131 MMHG | DIASTOLIC BLOOD PRESSURE: 78 MMHG

## 2024-03-19 DIAGNOSIS — H57.8A9 SENSATION OF FOREIGN BODY IN EYE: Primary | ICD-10-CM

## 2024-03-19 PROCEDURE — 99283 EMERGENCY DEPT VISIT LOW MDM: CPT

## 2024-03-19 RX ORDER — ERYTHROMYCIN 5 MG/G
OINTMENT OPHTHALMIC
Qty: 3.5 G | Refills: 0 | Status: SHIPPED | OUTPATIENT
Start: 2024-03-19

## 2024-03-19 RX ORDER — IBUPROFEN 600 MG/1
600 TABLET ORAL ONCE
Status: COMPLETED | OUTPATIENT
Start: 2024-03-19 | End: 2024-03-19

## 2024-03-19 RX ORDER — ERYTHROMYCIN 5 MG/G
0.5 OINTMENT OPHTHALMIC ONCE
Status: COMPLETED | OUTPATIENT
Start: 2024-03-19 | End: 2024-03-19

## 2024-03-19 RX ADMIN — ERYTHROMYCIN 0.5 INCH: 5 OINTMENT OPHTHALMIC at 16:54

## 2024-03-19 RX ADMIN — IBUPROFEN 600 MG: 600 TABLET, FILM COATED ORAL at 16:55

## 2024-03-19 RX ADMIN — FLUORESCEIN SODIUM 1 STRIP: 1 STRIP OPHTHALMIC at 16:44

## 2024-03-19 NOTE — DISCHARGE INSTRUCTIONS
There was no foreign body noted in your eye during our exam.  Your symptoms may be local irritation/something hitting your lower eyelid.  Use 1/4 inch ribbon to right lower eyelid 3 times a day for next few days.  Tylenol or Ibuprofen for pain    Follow-up with eye doctor as needed

## 2024-03-19 NOTE — ED PROVIDER NOTES
History  Chief Complaint   Patient presents with    Foreign Body in Eye     Pt presents to the ed after cleaning a skin and reports getting something in right eye, washed eye out but reports something is still  in there,     PMH: Hypothyroidism  PSH: wisdom tooth extraction    Patient presents to ED c/o foreign body sensation to right inner lower eyelid.  Patient was at work here, was cleaning a sink and thinks a small white piece of debris either Lime/calcium/metal may have come up and got into her right lower eye.   Patient states she did wash out with water but still feels like there is something in there so wanted to be checked.  Patient denies discharge, vision changes, does have a small headache on right side, no skin changes no other complaints        Prior to Admission Medications   Prescriptions Last Dose Informant Patient Reported? Taking?   levonorgestrel (Liletta, 52 MG,) 20.1 mcg/day IUD   Yes No   Si each by Intrauterine route Once every 8 years      Facility-Administered Medications: None       Past Medical History:   Diagnosis Date    Hypothyroidism        Past Surgical History:   Procedure Laterality Date    WISDOM TOOTH EXTRACTION         Family History   Problem Relation Age of Onset    Thyroid disease Mother     Hypertension Father     Hypertension Sister     Heart attack Maternal Grandfather     Thyroid cancer Paternal Grandmother     Cancer Paternal Grandfather         Cancer de prostata    Heart attack Maternal Uncle     Thyroid cancer Paternal Aunt      I have reviewed and agree with the history as documented.    E-Cigarette/Vaping    E-Cigarette Use Never User      E-Cigarette/Vaping Substances    Nicotine No     THC No     CBD No     Flavoring No     Other No     Unknown No      Social History     Tobacco Use    Smoking status: Never    Smokeless tobacco: Never   Vaping Use    Vaping status: Never Used   Substance Use Topics    Alcohol use: Never    Drug use: Never       Review of  Systems   Constitutional:  Negative for fever.   HENT:  Negative for trouble swallowing.    Eyes:  Positive for pain. Negative for photophobia, discharge, redness, itching and visual disturbance.   Gastrointestinal:  Negative for vomiting.   Skin:  Negative for rash.   Neurological:  Positive for headaches.   All other systems reviewed and are negative.      Physical Exam  Physical Exam  Vitals and nursing note reviewed.   Constitutional:       General: She is not in acute distress.     Appearance: Normal appearance. She is well-developed.   HENT:      Head: Normocephalic and atraumatic.      Right Ear: External ear normal.      Left Ear: External ear normal.      Nose: Nose normal.      Mouth/Throat:      Mouth: Mucous membranes are moist.      Pharynx: Oropharynx is clear.   Eyes:      General: Lids are normal. Lids are everted, no foreign bodies appreciated. Vision grossly intact.         Right eye: No foreign body or discharge.      Extraocular Movements: Extraocular movements intact.      Conjunctiva/sclera: Conjunctivae normal.      Right eye: Right conjunctiva is not injected. No exudate.     Pupils: Pupils are equal, round, and reactive to light.      Right eye: No corneal abrasion or fluorescein uptake.   Cardiovascular:      Rate and Rhythm: Normal rate.   Pulmonary:      Effort: Pulmonary effort is normal.   Musculoskeletal:         General: Normal range of motion.      Cervical back: Normal range of motion.   Skin:     General: Skin is warm and dry.      Findings: No rash.   Neurological:      Mental Status: She is alert.   Psychiatric:         Behavior: Behavior normal.         Vital Signs  ED Triage Vitals   Temperature Pulse Respirations Blood Pressure SpO2   03/19/24 1629 03/19/24 1629 03/19/24 1652 03/19/24 1629 03/19/24 1629   (!) 97.3 °F (36.3 °C) (!) 107 17 137/84 98 %      Temp Source Heart Rate Source Patient Position - Orthostatic VS BP Location FiO2 (%)   03/19/24 1629 03/19/24 1629 03/19/24  1629 03/19/24 1629 --   Oral Monitor Lying Left arm       Pain Score       03/19/24 1652       No Pain           Vitals:    03/19/24 1629 03/19/24 1652   BP: 137/84 131/78   Pulse: (!) 107 89   Patient Position - Orthostatic VS: Lying          Visual Acuity      ED Medications  Medications   fluorescein sodium sterile ophthalmic strip 1 strip (1 strip Right Eye Given 3/19/24 1644)   ibuprofen (MOTRIN) tablet 600 mg (600 mg Oral Given 3/19/24 1655)   erythromycin (ILOTYCIN) 0.5 % ophthalmic ointment 0.5 inch (0.5 inches Right Eye Given 3/19/24 1654)       Diagnostic Studies  Results Reviewed       None                   No orders to display              Procedures  Procedures         ED Course                               SBIRT 20yo+      Flowsheet Row Most Recent Value   Initial Alcohol Screen: US AUDIT-C     1. How often do you have a drink containing alcohol? 0 Filed at: 03/19/2024 1629   2. How many drinks containing alcohol do you have on a typical day you are drinking?  0 Filed at: 03/19/2024 1629   3a. Male UNDER 65: How often do you have five or more drinks on one occasion? 0 Filed at: 03/19/2024 1629   3b. FEMALE Any Age, or MALE 65+: How often do you have 4 or more drinks on one occassion? 0 Filed at: 03/19/2024 1629   Audit-C Score 0 Filed at: 03/19/2024 1629   MARTHA: How many times in the past year have you...    Used an illegal drug or used a prescription medication for non-medical reasons? Never Filed at: 03/19/2024 1629                      Medical Decision Making  No foreign body noted to right eye over cornea, conjunctiva, behind/under upper or lower eyelids, fluorescein with no uptake noted.  Irrigated well with normal saline.  Will use erythromycin ointment in case there is local lid irritation  No other acute eye problems noted    Amount and/or Complexity of Data Reviewed  External Data Reviewed: notes.             Disposition  Final diagnoses:   Sensation of foreign body in eye - right lower lid      Time reflects when diagnosis was documented in both MDM as applicable and the Disposition within this note       Time User Action Codes Description Comment    3/19/2024  4:45 PM Sana Avila Add [H57.8A9] Sensation of foreign body in eye     3/19/2024  4:46 PM Sana Avila Modify [H57.8A9] Sensation of foreign body in eye right lower lid          ED Disposition       ED Disposition   Discharge    Condition   Stable    Date/Time   Tue Mar 19, 2024 1645    Comment   Deepa Mccainpeyton discharge to home/self care.                   Follow-up Information       Follow up With Specialties Details Why Contact Info    Juventino Etienne MD Ophthalmology   [address] [place] [address]  [phone]              Patient's Medications   Discharge Prescriptions    ERYTHROMYCIN (ILOTYCIN) OPHTHALMIC OINTMENT    Place a 1/2 inch ribbon of ointment into the right lower eyelid.       Start Date: 3/19/2024 End Date: --       Order Dose: --       Quantity: 3.5 g    Refills: 0       No discharge procedures on file.    PDMP Review       None            ED Provider  Electronically Signed by             Sana Avila PA-C  03/19/24 9801

## 2024-03-21 ENCOUNTER — HOSPITAL ENCOUNTER (OUTPATIENT)
Dept: ULTRASOUND IMAGING | Facility: HOSPITAL | Age: 29
End: 2024-03-21
Payer: COMMERCIAL

## 2024-03-21 DIAGNOSIS — E01.0 THYROMEGALY: ICD-10-CM

## 2024-03-21 PROCEDURE — 76536 US EXAM OF HEAD AND NECK: CPT

## 2024-03-25 DIAGNOSIS — E28.2 PCOS (POLYCYSTIC OVARIAN SYNDROME): Primary | ICD-10-CM

## 2024-03-25 RX ORDER — METFORMIN HYDROCHLORIDE 500 MG/1
500 TABLET, EXTENDED RELEASE ORAL
Qty: 30 TABLET | Refills: 6 | Status: SHIPPED | OUTPATIENT
Start: 2024-03-25

## 2024-04-16 DIAGNOSIS — E28.2 PCOS (POLYCYSTIC OVARIAN SYNDROME): ICD-10-CM

## 2024-04-16 RX ORDER — METFORMIN HYDROCHLORIDE 500 MG/1
TABLET, EXTENDED RELEASE ORAL
Qty: 90 TABLET | Refills: 1 | Status: SHIPPED | OUTPATIENT
Start: 2024-04-16

## 2024-05-01 PROBLEM — Z01.419 ENCOUNTER FOR ANNUAL ROUTINE GYNECOLOGICAL EXAMINATION: Status: RESOLVED | Noted: 2024-03-08 | Resolved: 2024-05-01

## 2024-07-13 ENCOUNTER — APPOINTMENT (OUTPATIENT)
Dept: LAB | Facility: HOSPITAL | Age: 29
End: 2024-07-13

## 2024-07-13 DIAGNOSIS — Z00.8 HEALTH EXAMINATION IN POPULATION SURVEY: ICD-10-CM

## 2024-07-13 LAB
CHOLEST SERPL-MCNC: 154 MG/DL
EST. AVERAGE GLUCOSE BLD GHB EST-MCNC: 105 MG/DL
HBA1C MFR BLD: 5.3 %
HDLC SERPL-MCNC: 52 MG/DL
LDLC SERPL CALC-MCNC: 91 MG/DL (ref 0–100)
NONHDLC SERPL-MCNC: 102 MG/DL
TRIGL SERPL-MCNC: 57 MG/DL

## 2024-07-13 PROCEDURE — 36415 COLL VENOUS BLD VENIPUNCTURE: CPT

## 2024-07-13 PROCEDURE — 83036 HEMOGLOBIN GLYCOSYLATED A1C: CPT

## 2024-07-13 PROCEDURE — 80061 LIPID PANEL: CPT

## 2024-07-23 ENCOUNTER — OFFICE VISIT (OUTPATIENT)
Dept: FAMILY MEDICINE CLINIC | Facility: CLINIC | Age: 29
End: 2024-07-23
Payer: COMMERCIAL

## 2024-07-23 VITALS
TEMPERATURE: 98 F | BODY MASS INDEX: 35.79 KG/M2 | OXYGEN SATURATION: 99 % | RESPIRATION RATE: 16 BRPM | HEIGHT: 63 IN | WEIGHT: 202 LBS | HEART RATE: 81 BPM | SYSTOLIC BLOOD PRESSURE: 120 MMHG | DIASTOLIC BLOOD PRESSURE: 78 MMHG

## 2024-07-23 DIAGNOSIS — I83.813 VARICOSE VEINS OF BOTH LOWER EXTREMITIES WITH PAIN: ICD-10-CM

## 2024-07-23 DIAGNOSIS — M25.50 MULTIPLE JOINT PAIN: ICD-10-CM

## 2024-07-23 DIAGNOSIS — E66.09 CLASS 1 OBESITY DUE TO EXCESS CALORIES WITHOUT SERIOUS COMORBIDITY WITH BODY MASS INDEX (BMI) OF 34.0 TO 34.9 IN ADULT: ICD-10-CM

## 2024-07-23 DIAGNOSIS — M72.2 PLANTAR FASCIITIS, BILATERAL: ICD-10-CM

## 2024-07-23 DIAGNOSIS — E28.2 PCOS (POLYCYSTIC OVARIAN SYNDROME): ICD-10-CM

## 2024-07-23 DIAGNOSIS — Z00.00 ANNUAL PHYSICAL EXAM: Primary | ICD-10-CM

## 2024-07-23 DIAGNOSIS — F41.1 GENERALIZED ANXIETY DISORDER: ICD-10-CM

## 2024-07-23 DIAGNOSIS — E28.2 POLYCYSTIC OVARY: ICD-10-CM

## 2024-07-23 DIAGNOSIS — E01.0 THYROMEGALY: ICD-10-CM

## 2024-07-23 PROCEDURE — 99214 OFFICE O/P EST MOD 30 MIN: CPT | Performed by: FAMILY MEDICINE

## 2024-07-23 PROCEDURE — 99395 PREV VISIT EST AGE 18-39: CPT | Performed by: FAMILY MEDICINE

## 2024-07-23 RX ORDER — METFORMIN HYDROCHLORIDE 500 MG/1
500 TABLET, EXTENDED RELEASE ORAL
Qty: 90 TABLET | Refills: 3 | Status: SHIPPED | OUTPATIENT
Start: 2024-07-23 | End: 2024-07-25 | Stop reason: SDUPTHER

## 2024-07-23 RX ORDER — ESCITALOPRAM OXALATE 5 MG/1
5 TABLET ORAL DAILY
Qty: 30 TABLET | Refills: 5 | Status: SHIPPED | OUTPATIENT
Start: 2024-07-23

## 2024-07-23 NOTE — PROGRESS NOTES
Subjective: Patient here for annual physical pt with bilateral hand pain hand and bilateral wrist  pt works in housekeeping ; also always feels anxious  no depression       Patient ID: Deepa Darling is a 29 y.o. female.    Hand Pain   Pertinent negatives include no chest pain or numbness.   Wrist Pain   Pertinent negatives include no fever or numbness.       Past Medical History:   Diagnosis Date   • Hypothyroidism        Family History   Problem Relation Age of Onset   • Thyroid disease Mother    • Hypertension Father    • Hypertension Sister    • Heart attack Maternal Grandfather    • Thyroid cancer Paternal Grandmother    • Cancer Paternal Grandfather         Cancer de prostata   • Heart attack Maternal Uncle    • Thyroid cancer Paternal Aunt        Past Surgical History:   Procedure Laterality Date   • WISDOM TOOTH EXTRACTION          reports that she has never smoked. She has never used smokeless tobacco. She reports that she does not drink alcohol and does not use drugs.      Current Outpatient Medications:   •  escitalopram (LEXAPRO) 5 mg tablet, Take 1 tablet (5 mg total) by mouth daily, Disp: 30 tablet, Rfl: 5  •  levonorgestrel (Liletta, 52 MG,) 20.1 mcg/day IUD, 1 each by Intrauterine route Once every 8 years, Disp: , Rfl:   •  metFORMIN (GLUCOPHAGE-XR) 500 mg 24 hr tablet, Take 1 tablet (500 mg total) by mouth daily with breakfast, Disp: 90 tablet, Rfl: 3    The following portions of the patient's history were reviewed and updated as appropriate: allergies, current medications, past family history, past medical history, past social history, past surgical history and problem list.    Review of Systems   Constitutional:  Negative for appetite change, chills, fatigue and fever.   Respiratory:  Negative for cough, chest tightness and shortness of breath.    Cardiovascular:  Negative for chest pain, palpitations and leg swelling.   Gastrointestinal:  Negative for abdominal pain, constipation,  "diarrhea, nausea and vomiting.   Genitourinary:  Negative for difficulty urinating and frequency.   Musculoskeletal:  Positive for arthralgias. Negative for back pain, gait problem and neck pain.        Plantar fasciitis  Hands wrists elbows    Skin:  Negative for rash.   Neurological:  Negative for dizziness, weakness, light-headedness, numbness and headaches.   Hematological:  Does not bruise/bleed easily.   Psychiatric/Behavioral:  Negative for dysphoric mood and sleep disturbance. The patient is nervous/anxious.          PHQ-2/9 Depression Screening    Little interest or pleasure in doing things: 1 - several days  Feeling down, depressed, or hopeless: 1 - several days  PHQ-2 Score: 2  PHQ-2 Interpretation: Negative depression screen             Objective:    /78 (BP Location: Left arm, Patient Position: Sitting, Cuff Size: Standard)   Pulse 81   Temp 98 °F (36.7 °C) (Tympanic)   Resp 16   Ht 5' 3\" (1.6 m)   Wt 91.6 kg (202 lb)   SpO2 99%   BMI 35.78 kg/m²      Physical Exam  Vitals reviewed.   Constitutional:       General: She is not in acute distress.     Appearance: Normal appearance. She is well-developed. She is not ill-appearing.   HENT:      Head: Normocephalic.      Right Ear: Tympanic membrane, ear canal and external ear normal.      Left Ear: Tympanic membrane, ear canal and external ear normal.      Nose: Nose normal.      Mouth/Throat:      Mouth: Mucous membranes are moist.      Pharynx: No oropharyngeal exudate.   Eyes:      General: Lids are normal. No scleral icterus.     Extraocular Movements: Extraocular movements intact.      Conjunctiva/sclera: Conjunctivae normal.      Pupils: Pupils are equal, round, and reactive to light.   Neck:      Thyroid: No thyromegaly.      Vascular: No carotid bruit.   Cardiovascular:      Rate and Rhythm: Normal rate and regular rhythm.      Pulses: Normal pulses.      Heart sounds: Normal heart sounds. No murmur heard.     No friction rub. "   Pulmonary:      Effort: Pulmonary effort is normal. No respiratory distress.      Breath sounds: Normal breath sounds. No wheezing, rhonchi or rales.   Chest:      Chest wall: No tenderness.   Abdominal:      General: Bowel sounds are normal. There is no distension.      Palpations: Abdomen is soft. There is no mass.      Tenderness: There is no abdominal tenderness. There is no guarding.      Hernia: No hernia is present.   Musculoskeletal:         General: Normal range of motion.      Cervical back: Normal range of motion and neck supple.   Lymphadenopathy:      Cervical: No cervical adenopathy.   Skin:     General: Skin is warm and dry.      Findings: No rash.      Comments: No abnormal appearing moles   Neurological:      General: No focal deficit present.      Mental Status: She is alert and oriented to person, place, and time. Mental status is at baseline.      Cranial Nerves: No cranial nerve deficit.      Sensory: No sensory deficit.      Motor: No weakness, tremor or abnormal muscle tone.      Coordination: Coordination normal.      Gait: Gait normal.      Deep Tendon Reflexes: Reflexes normal.   Psychiatric:         Mood and Affect: Mood normal.         Speech: Speech normal.         Behavior: Behavior normal.           Recent Results (from the past 8736 hour(s))   ECG 12 lead    Collection Time: 12/31/23  8:28 AM   Result Value Ref Range    Ventricular Rate 111 BPM    Atrial Rate 111 BPM    NC Interval 122 ms    QRSD Interval 84 ms    QT Interval 302 ms    QTC Interval 410 ms    P Axis 80 degrees    QRS Axis 86 degrees    T Wave Axis 31 degrees   CBC and differential    Collection Time: 12/31/23  8:54 AM   Result Value Ref Range    WBC 10.28 (H) 4.31 - 10.16 Thousand/uL    RBC 5.37 (H) 3.81 - 5.12 Million/uL    Hemoglobin 15.1 11.5 - 15.4 g/dL    Hematocrit 45.4 34.8 - 46.1 %    MCV 85 82 - 98 fL    MCH 28.1 26.8 - 34.3 pg    MCHC 33.3 31.4 - 37.4 g/dL    RDW 12.8 11.6 - 15.1 %    MPV 9.3 8.9 - 12.7 fL     Platelets 329 149 - 390 Thousands/uL    nRBC 0 /100 WBCs    Segmented % 74 43 - 75 %    Immature Grans % 1 0 - 2 %    Lymphocytes % 15 14 - 44 %    Monocytes % 9 4 - 12 %    Eosinophils Relative 1 0 - 6 %    Basophils Relative 0 0 - 1 %    Absolute Neutrophils 7.70 (H) 1.85 - 7.62 Thousands/µL    Absolute Immature Grans 0.07 0.00 - 0.20 Thousand/uL    Absolute Lymphocytes 1.49 0.60 - 4.47 Thousands/µL    Absolute Monocytes 0.93 0.17 - 1.22 Thousand/µL    Eosinophils Absolute 0.07 0.00 - 0.61 Thousand/µL    Basophils Absolute 0.02 0.00 - 0.10 Thousands/µL   Comprehensive metabolic panel    Collection Time: 12/31/23  8:54 AM   Result Value Ref Range    Sodium 137 135 - 147 mmol/L    Potassium 3.2 (L) 3.5 - 5.3 mmol/L    Chloride 103 96 - 108 mmol/L    CO2 25 21 - 32 mmol/L    ANION GAP 9 mmol/L    BUN 12 5 - 25 mg/dL    Creatinine 0.74 0.60 - 1.30 mg/dL    Glucose 81 65 - 140 mg/dL    Calcium 9.1 8.4 - 10.2 mg/dL    AST 14 13 - 39 U/L    ALT 13 7 - 52 U/L    Alkaline Phosphatase 77 34 - 104 U/L    Total Protein 7.4 6.4 - 8.4 g/dL    Albumin 4.4 3.5 - 5.0 g/dL    Total Bilirubin 0.73 0.20 - 1.00 mg/dL    eGFR 110 ml/min/1.73sq m   TSH    Collection Time: 12/31/23  8:54 AM   Result Value Ref Range    TSH 3RD GENERATON 1.049 0.450 - 4.500 uIU/mL   T4, free    Collection Time: 12/31/23  8:54 AM   Result Value Ref Range    Free T4 0.91 0.61 - 1.12 ng/dL   High Sensitivity Troponin I Random    Collection Time: 12/31/23  8:54 AM   Result Value Ref Range    HS TnI random <2 (L) 8 - 18 ng/L   ECG 12 lead    Collection Time: 12/31/23 10:08 AM   Result Value Ref Range    Ventricular Rate 80 BPM    Atrial Rate 80 BPM    DC Interval 124 ms    QRSD Interval 88 ms    QT Interval 352 ms    QTC Interval 405 ms    P Axis 78 degrees    QRS Axis 90 degrees    T Wave Axis 65 degrees   Liquid-based pap, screening    Collection Time: 03/11/24  3:04 PM   Result Value Ref Range    Case Report       Gynecologic Cytology Report                        Case: [id number]                                  Authorizing Provider:  Santos Lozano MD          Collected:           03/11/2024 1504              Ordering Location:     [facility]      Received:            03/11/2024 1504                                     [facility]                                                                               Danae Alonso Screen:          Erna Clancy                                                                Specimen:    LIQUID-BASED PAP, SCREENING, Cervix                                                        Primary Interpretation Negative for intraepithelial lesion or malignancy     Interpretation Shift in micah suggestive of bacterial vaginosis     Specimen Adequacy       Satisfactory for evaluation. Endocervical/transformation zone component present.    Additional Information       [email or web address]'s FDA approved ,  and ThinPrep Imaging Duo System are utilized with strict adherence to the 's instruction manual to prepare gynecologic and non-gynecologic cytology specimens for the production of ThinPrep slides as well as for gynecologic ThinPrep imaging. These processes have been validated by our laboratory and/or by the .  The Pap test is not a diagnostic procedure and should not be used as the sole means to detect cervical cancer. It is only a screening procedure to aid in the detection of cervical cancer and its precursors. Both false-negative and false-positive results have been experienced. Your patient's test result should be interpreted in this context together with the history and clinical findings.     Chlamydia/GC amplified DNA by PCR    Collection Time: 03/11/24  3:04 PM    Specimen: Cervix; Thin-Prep Vial   Result Value Ref Range    N gonorrhoeae, DNA Probe Negative Negative    Chlamydia trachomatis, DNA Probe Negative Negative    Hepatitis C antibody    Collection Time: 03/16/24  9:20 AM   Result Value Ref Range    Hepatitis C Ab Non-reactive Non-Reactive   Hepatitis B surface antigen    Collection Time: 03/16/24  9:20 AM   Result Value Ref Range    Hepatitis B Surface Ag Non-reactive Non-Reactive   HIV 1/2 AG/AB w Reflex [facility] for 2 yr old and above    Collection Time: 03/16/24  9:20 AM   Result Value Ref Range    HIV-1 p24 Antigen Non-Reactive Non-Reactive    HIV-1 Antibody Non-Reactive Non-Reactive    HIV-2 Antibody Non-Reactive Non-Reactive    HIV Ag-Ab 5th Gen Non-Reactive Non-Reactive   RPR-Syphilis Screening (Total Syphilis IGG/IGM)    Collection Time: 03/16/24  9:20 AM   Result Value Ref Range    Syphilis Total Antibody Non-reactive Non-Reactive   CBC and differential    Collection Time: 03/16/24  9:20 AM   Result Value Ref Range    WBC 8.30 4.31 - 10.16 Thousand/uL    RBC 5.31 (H) 3.81 - 5.12 Million/uL    Hemoglobin 14.6 11.5 - 15.4 g/dL    Hematocrit 46.3 (H) 34.8 - 46.1 %    MCV 87 82 - 98 fL    MCH 27.5 26.8 - 34.3 pg    MCHC 31.5 31.4 - 37.4 g/dL    RDW 13.0 11.6 - 15.1 %    MPV 9.6 8.9 - 12.7 fL    Platelets 328 149 - 390 Thousands/uL    nRBC 0 /100 WBCs    Segmented % 71 43 - 75 %    Immature Grans % 1 0 - 2 %    Lymphocytes % 17 14 - 44 %    Monocytes % 8 4 - 12 %    Eosinophils Relative 2 0 - 6 %    Basophils Relative 1 0 - 1 %    Absolute Neutrophils 5.96 1.85 - 7.62 Thousands/µL    Absolute Immature Grans 0.06 0.00 - 0.20 Thousand/uL    Absolute Lymphocytes 1.43 0.60 - 4.47 Thousands/µL    Absolute Monocytes 0.67 0.17 - 1.22 Thousand/µL    Eosinophils Absolute 0.13 0.00 - 0.61 Thousand/µL    Basophils Absolute 0.05 0.00 - 0.10 Thousands/µL   T4, free    Collection Time: 03/16/24  9:20 AM   Result Value Ref Range    Free T4 0.86 0.61 - 1.12 ng/dL   TSH, 3rd generation    Collection Time: 03/16/24  9:20 AM   Result Value Ref Range    TSH 3RD GENERATON 1.016 0.450 - 4.500 uIU/mL   Insulin, fasting    Collection Time:  03/16/24  9:20 AM   Result Value Ref Range    Insulin, Fasting 50.50 (H) 1.90 - 23.00 uIU/mL   Hemoglobin A1C    Collection Time: 07/13/24  7:54 AM   Result Value Ref Range    Hemoglobin A1C 5.3 Normal 4.0-5.6%; PreDiabetic 5.7-6.4%; Diabetic >=6.5%; Glycemic control for adults with diabetes <7.0% %     mg/dl   Lipid panel    Collection Time: 07/13/24  7:54 AM   Result Value Ref Range    Cholesterol 154 See Comment mg/dL    Triglycerides 57 See Comment mg/dL    HDL, Direct 52 >=50 mg/dL    LDL Calculated 91 0 - 100 mg/dL    Non-HDL-Chol (CHOL-HDL) 102 mg/dl       Laboratory Results: I have personally reviewed the pertinent laboratory results/reports     Radiology/Other Diagnostic Testing Results: I have personally reviewed pertinent reports.      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 COMPARISON: None TECHNIQUE: Ultrasound of the thyroid was performed with a high frequency linear transducer in transverse and sagittal planes including volumetric imaging sweeps as well as traditional still imaging technique. FINDINGS: Thyroid texture: Normal homogeneous smooth echotexture. Lower pole circumscribed hypoechoic nodule with benign appearance, probably intrathyroidal lymph node, 7 x 3 x 5 mm. Right lobe: 5.7 x 1.8 x 2.2 cm. Volume 10.8 mL Left lobe: 5.2 x 1.8 x 1.9 cm. Volume 8.3 mL Isthmus: 0.4 cm.     Unremarkable examination. Benign-appearing 7 mm left thyroid lobe nodule probably represents an intrathyroidal lymph node. No nodule which meets current ACR criteria for requiring biopsy or follow-up ultrasounds. Reference: ACR Thyroid Imaging, Reporting and Data System (TI-RADS): White Paper of the ACR TI-RADS Committee. J AM Clarisa Radiol 2017;14:587-595. Additional recommendations based on American Thyroid Association 2015 guidelines. Workstation performed: [id number]        Assessment/Plan:  1. Annual physical exam  Assessment  "& Plan:  Labs done except cmp will  order  Orders:  -     Comprehensive metabolic panel; Future; Expected date: 07/23/2024  2. PCOS (polycystic ovarian syndrome)  -     metFORMIN (GLUCOPHAGE-XR) 500 mg 24 hr tablet; Take 1 tablet (500 mg total) by mouth daily with breakfast  3. Polycystic ovary  Assessment & Plan:  On metformin has high insulin level   4. Varicose veins of both lower extremities with pain  Assessment & Plan:  Compression stockings  5. Multiple joint pain  -     W[facility] Comprehensive Panel; Future  -     RF Screen w/ Reflex to Titer; Future  -     Sedimentation rate, automated; Future  -     Lyme Total AB W Reflex to IGM/IGG; Future  6. Thyromegaly  Assessment & Plan:  US thryoid benign nodule   7. Class 1 obesity due to excess calories without serious comorbidity with body mass index (BMI) of 34.0 to 34.9 in adult  Assessment & Plan:  Discussion on diet and exercise guidelines for weight loss and  health reviewed with pt     8. Generalized anxiety disorder  Assessment & Plan:  Start lexapro 5mg po qd side effects explained follow up 1 mo  Orders:  -     escitalopram (LEXAPRO) 5 mg tablet; Take 1 tablet (5 mg total) by mouth daily  9. Plantar fasciitis, bilateral  Assessment & Plan:  Avoid flat shoes heel pad inserts                   Read package inserts for all medications before starting a new medications, call me if you have any questions.    Patient was given opportunity to ask questions and all questions were answered.    Disclaimer: Portions of the record may have been created with voice recognition software. Occasional wrong word or \"sound a like\" substitutions may have occurred due to the inherent limitations of voice recognition software. Read the chart carefully and recognize, using context, where substitutions have occurred. I have used the Epic copy/forward function to compose this note. I have reviewed my current note to ensure it reflects the current patient status, exam, assessment and " plan.

## 2024-07-25 DIAGNOSIS — E28.2 PCOS (POLYCYSTIC OVARIAN SYNDROME): ICD-10-CM

## 2024-07-25 RX ORDER — METFORMIN HYDROCHLORIDE 500 MG/1
500 TABLET, EXTENDED RELEASE ORAL
Qty: 100 TABLET | Refills: 1 | Status: SHIPPED | OUTPATIENT
Start: 2024-07-25

## 2024-07-25 NOTE — TELEPHONE ENCOUNTER
** Pharmacy change**     Reason for call:   [x] Refill   [] Prior Auth  [] Other:     Office:   [x] PCP/Provider -   [] Specialty/Provider -     Medication: metFORMIN (GLUCOPHAGE-XR) 500 mg  1 tablet daily       [facility]     Does the patient have enough for 3 days?   [] Yes   [x] No - Send as HP to POD

## 2024-08-16 DIAGNOSIS — F41.1 GENERALIZED ANXIETY DISORDER: ICD-10-CM

## 2024-08-16 RX ORDER — ESCITALOPRAM OXALATE 5 MG/1
5 TABLET ORAL DAILY
Qty: 90 TABLET | Refills: 1 | Status: SHIPPED | OUTPATIENT
Start: 2024-08-16

## 2024-08-27 ENCOUNTER — OFFICE VISIT (OUTPATIENT)
Dept: FAMILY MEDICINE CLINIC | Facility: CLINIC | Age: 29
End: 2024-08-27
Payer: COMMERCIAL

## 2024-08-27 VITALS
DIASTOLIC BLOOD PRESSURE: 72 MMHG | TEMPERATURE: 98 F | SYSTOLIC BLOOD PRESSURE: 124 MMHG | RESPIRATION RATE: 16 BRPM | OXYGEN SATURATION: 96 % | WEIGHT: 198 LBS | HEART RATE: 113 BPM | BODY MASS INDEX: 35.08 KG/M2 | HEIGHT: 63 IN

## 2024-08-27 DIAGNOSIS — L50.9 URTICARIA: Primary | ICD-10-CM

## 2024-08-27 DIAGNOSIS — M25.50 PAIN IN JOINT, MULTIPLE SITES: ICD-10-CM

## 2024-08-27 DIAGNOSIS — F41.1 GENERALIZED ANXIETY DISORDER: ICD-10-CM

## 2024-08-27 PROCEDURE — 99214 OFFICE O/P EST MOD 30 MIN: CPT | Performed by: FAMILY MEDICINE

## 2024-08-27 RX ORDER — FAMOTIDINE 40 MG/1
40 TABLET, FILM COATED ORAL
Qty: 90 TABLET | Refills: 1 | Status: SHIPPED | OUTPATIENT
Start: 2024-08-27 | End: 2025-08-22

## 2024-08-27 RX ORDER — FAMOTIDINE 40 MG/1
40 TABLET, FILM COATED ORAL DAILY
Qty: 30 TABLET | Refills: 3 | Status: SHIPPED | OUTPATIENT
Start: 2024-08-27 | End: 2024-08-27

## 2024-08-27 RX ORDER — FEXOFENADINE HCL 180 MG/1
180 TABLET ORAL DAILY
Qty: 100 TABLET | Refills: 3 | Status: SHIPPED | OUTPATIENT
Start: 2024-08-27 | End: 2024-08-27

## 2024-08-27 RX ORDER — MELOXICAM 15 MG/1
15 TABLET ORAL DAILY PRN
Qty: 90 TABLET | Refills: 1 | Status: SHIPPED | OUTPATIENT
Start: 2024-08-27

## 2024-08-27 NOTE — ASSESSMENT & PLAN NOTE
Pt did not tolerate   the lexapro ; pt with many stressful situations  getting therapy virtually declines meds

## 2024-08-27 NOTE — PROGRESS NOTES
"Ambulatory Visit  Name: Deepa Darling      : 1995      MRN: [id number]  Encounter Provider: Bernadine Earl MD  Encounter Date: 2024   Encounter department: [facility] MED[facility]INE    Assessment & Plan   1. Urticaria  Assessment & Plan:  Pt with chronic itching/ hives  will start allegra and  famotidine 40mg  qd   Orders:  -     Ambulatory Referral to Dermatology; Future  -     famotidine (PEPCID) 40 MG tablet; Take 1 tablet (40 mg total) by mouth daily at bedtime  -     fexofenadine (ALLEGRA ODT) 30 MG disintegrating tablet; Take 1 tablet (30 mg total) by mouth daily  2. Generalized anxiety disorder  Assessment & Plan:  Pt did not tolerate   the lexapro ; pt with many stressful situations  getting therapy virtually declines meds   3. Pain in joint, multiple sites  Assessment & Plan:  Labs ordered last visit will try meloxicam 15mg po qd   Orders:  -     meloxicam (MOBIC) 15 mg tablet; Take 1 tablet (15 mg total) by mouth daily as needed for moderate pain       History of Present Illness     Pt here for reevaluation of anxiety  used lexapro for 2 weeks discontinued due to\"tiredness\" pt has been getting itchy rash on off for 1 yr worse with heart and with stress    Anxiety            Review of Systems    Objective     /72 (BP Location: Left arm, Patient Position: Sitting, Cuff Size: Standard)   Pulse (!) 113   Temp 98 °F (36.7 °C) (Tympanic)   Resp 16   Ht 5' 3\" (1.6 m)   Wt 89.8 kg (198 lb)   SpO2 96%   BMI 35.07 kg/m²     Physical Exam  Constitutional:       Appearance: Normal appearance. She is well-developed.   HENT:      Head: Normocephalic.   Pulmonary:      Effort: Pulmonary effort is normal. No respiratory distress.      Breath sounds: Normal breath sounds.   Skin:     Findings: Rash (several blanching macules  arms back) present.   Neurological:      General: No focal deficit present.      Mental Status: She is alert and oriented to person, place, and " time. Mental status is at baseline.   Psychiatric:         Behavior: Behavior normal.         Thought Content: Thought content normal.         Judgment: Judgment normal.       Administrative Statements

## 2024-11-19 NOTE — PROGRESS NOTES
[facility] [place]  [address] [facility]  [place] [address]  Phone#  [phone]  Fax#  [phone]  [facility]'s Cardiology Office Consultation             NAME: Deepa Darling  AGE: 29 y.o. SEX: female   : 1995   MRN: [id number]    DATE: 2024  TIME: 1:43 PM    Assessment & Plan  Chest pain, unspecified type  Does report that her chest pain is substernal and occasionally brought on with exertion.  Likely low risk of cardiac chest pain, but will do further evaluation with exercise stress test to rule out evidence of ischemia.  Transthoracic echocardiogram to assess for structural abnormalities  Will obtain exercise stress test  Family history of premature CAD  Reports possible family history of early coronary artery disease versus sudden cardiac death.  Optimize her risk factor assessment, I will add on a LP(a)  Check LP(a)  Hypokalemia  Recheck BMP    Dizziness, fatigue and headache are all side effects of levonorgestrel    Follow up 1 month for results review      ------     Chief Complaint   Patient presents with    New Patient Visit     Establishing care.     Chest Pain     Off and on pressure. More with exercise.     Shortness of Breath     With exertion.     Fatigue     More as of late,     Palpitations     Fast and skipping beats. Works night shift and notices it more.     Dizziness     With fast changing positions.        HPI:    Deepa Darling is a 29 y.o.-year-old female who presents to the cardiology clinic for chest pain.    History was obtained with [facility]  [id number] Mercado.    Past medical history is significant for PCOS and anxiety.  I reviewed her records in epic.  She was evaluated on 2023 for chest pain in the emergency room.  At that time, her ECG did not show evidence of ischemia and she had negative high-sensitivity troponin.    She states that she has been having issues with chest pain that started approximately 3 years ago  while she was still living in [place].  Reports that she does not think she had a thorough medical evaluation there and was not happy with her care.    She develops a substernal pressure and squeezing-like chest discomfort and pain that occasionally radiates up her neck and down her right arm.  Occasionally occurs at night and wakes her up, but other times the pain is exertional.  She does note increased frequency of pain when she is dealing with stress.  She also notes poor sleep.  Notes the pain is occasionally associated with leg numbness and pain.  She states that she occasionally feels her heart is racing during these events.    In terms of family history, she reports multiple family members have had early cardiac death.  Reports 1 maternal uncle who was believed to be in good health passed away suddenly from what they believed was a heart attack at age 33  She also reports a maternal grandmother who passed away at age 87 from heart disease  Reports another family member passed away in their mid 70s, but they are unclear if this was related to their  heart disease      ------    I personally reviewed the patient's pertinent cardiac imaging and labs in Epic:    7/13/24 Lipid panel showed total cholesterol 154, HDL 52, TG 57, LDL 91   7/13/24 hgb A1c 5.3%  3/16/24 TSH 1.0  3/16/24 CBC normal WBC, and platelet counts; high hct and RBC count    12/31/23 ECG - NSR, rightward axis  11/20/24 ECG -sinus tachycardia with nonspecific ST normalities  -----    Past history, family history, social history, current medications, vital signs, recent lab and imaging studies and  prior cardiology studies reviewed independently on this visit.    Allergies   Allergen Reactions    Iodine - Food Allergy Hives       Current Outpatient Medications:     famotidine (PEPCID) 40 MG tablet, Take 1 tablet (40 mg total) by mouth daily at bedtime, Disp: 90 tablet, Rfl: 1    fexofenadine (ALLEGRA ODT) 30 MG disintegrating tablet, Take 1  tablet (30 mg total) by mouth daily, Disp: 90 tablet, Rfl: 1    levonorgestrel (Liletta, 52 MG,) 20.1 mcg/day IUD, 1 each by Intrauterine route Once every 8 years, Disp: , Rfl:     meloxicam (MOBIC) 15 mg tablet, Take 1 tablet (15 mg total) by mouth daily as needed for moderate pain, Disp: 90 tablet, Rfl: 1    metFORMIN (GLUCOPHAGE-XR) 500 mg 24 hr tablet, Take 1 tablet (500 mg total) by mouth daily with breakfast, Disp: 100 tablet, Rfl: 1    Past Medical History:   Diagnosis Date    Hypothyroidism      Past Surgical History:   Procedure Laterality Date    WISDOM TOOTH EXTRACTION       Family History   Problem Relation Age of Onset    Thyroid disease Mother     Hypertension Father     Hypertension Sister     Heart attack Maternal Grandfather     Thyroid cancer Paternal Grandmother     Cancer Paternal Grandfather         Cancer de prostata    Heart attack Maternal Uncle     Thyroid cancer Paternal Aunt        Social History   reports that she has never smoked. She has never used smokeless tobacco. She reports that she does not drink alcohol and does not use drugs.     Review of Systems   Constitutional:  Negative for fatigue.   Respiratory:  Positive for chest tightness. Negative for shortness of breath.    Cardiovascular:  Positive for chest pain and palpitations. Negative for leg swelling.   Neurological:  Positive for light-headedness.   Psychiatric/Behavioral:  The patient is nervous/anxious.          Objective:     Vitals:    11/20/24 1336   BP: 122/64   Pulse: (!) 106   SpO2: 100%     Physical Exam  Vitals reviewed.   Constitutional:       General: She is not in acute distress.     Appearance: She is well-developed.   HENT:      Head: Normocephalic and atraumatic.   Eyes:      Conjunctiva/sclera: Conjunctivae normal.   Cardiovascular:      Rate and Rhythm: Normal rate and regular rhythm.      Heart sounds: No murmur heard.  Pulmonary:      Effort: Pulmonary effort is normal. No respiratory distress.      Breath  "sounds: Normal breath sounds.   Musculoskeletal:         General: No swelling.      Cervical back: Neck supple.   Skin:     General: Skin is warm and dry.   Neurological:      Mental Status: She is alert.   Psychiatric:         Mood and Affect: Mood normal.         Pertinent Laboratory/Diagnostic Studies:    Laboratory studies reviewed personally by Helga Cummings MD    BMP:   Lab Results   Component Value Date    SODIUM 137 12/31/2023    K 3.2 (L) 12/31/2023     12/31/2023    CO2 25 12/31/2023    BUN 12 12/31/2023    CREATININE 0.74 12/31/2023    GLUC 81 12/31/2023    CALCIUM 9.1 12/31/2023     CBC:  Lab Results   Component Value Date    WBC 8.30 03/16/2024    HGB 14.6 03/16/2024    HCT 46.3 (H) 03/16/2024    MCV 87 03/16/2024     03/16/2024     Lipid Profile:   Lab Results   Component Value Date    HDL 52 07/13/2024     Lab Results   Component Value Date    LDLCALC 91 07/13/2024     Lab Results   Component Value Date    TRIG 57 07/13/2024      Other labs:  Lab Results   Component Value Date    VTM9WLMIFRCX 1.016 03/16/2024     Lab Results   Component Value Date    ALT 13 12/31/2023    AST 14 12/31/2023       Imaging Studies:     Pertinent cardiac studies and imaging studies were personally reviewed on this visit and results summarized above.    Helga Cummings MD, PhD    Portions of the record may have been created with voice recognition software.  Occasional wrong word or \"sound alike\" substitutions may have occurred due to the inherent limitations of voice recognition software.  Read the chart carefully and recognize, using context, where substitutions have occurred. Please reach out to me directly for any clarifications.   "

## 2024-11-20 ENCOUNTER — OFFICE VISIT (OUTPATIENT)
Dept: CARDIOLOGY CLINIC | Facility: CLINIC | Age: 29
End: 2024-11-20
Payer: COMMERCIAL

## 2024-11-20 VITALS
HEART RATE: 106 BPM | BODY MASS INDEX: 35.97 KG/M2 | HEIGHT: 63 IN | OXYGEN SATURATION: 100 % | SYSTOLIC BLOOD PRESSURE: 122 MMHG | WEIGHT: 203 LBS | DIASTOLIC BLOOD PRESSURE: 64 MMHG

## 2024-11-20 DIAGNOSIS — E87.6 HYPOKALEMIA: ICD-10-CM

## 2024-11-20 DIAGNOSIS — R07.9 CHEST PAIN, UNSPECIFIED TYPE: Primary | ICD-10-CM

## 2024-11-20 DIAGNOSIS — Z82.49 FAMILY HISTORY OF PREMATURE CAD: ICD-10-CM

## 2024-11-20 PROCEDURE — 99204 OFFICE O/P NEW MOD 45 MIN: CPT | Performed by: STUDENT IN AN ORGANIZED HEALTH CARE EDUCATION/TRAINING PROGRAM

## 2024-11-20 PROCEDURE — 93000 ELECTROCARDIOGRAM COMPLETE: CPT | Performed by: STUDENT IN AN ORGANIZED HEALTH CARE EDUCATION/TRAINING PROGRAM

## 2024-11-26 ENCOUNTER — TELEPHONE (OUTPATIENT)
Dept: BARIATRICS | Facility: CLINIC | Age: 29
End: 2024-11-26

## 2024-11-26 ENCOUNTER — OFFICE VISIT (OUTPATIENT)
Dept: BARIATRICS | Facility: CLINIC | Age: 29
End: 2024-11-26
Payer: COMMERCIAL

## 2024-11-26 VITALS
DIASTOLIC BLOOD PRESSURE: 82 MMHG | OXYGEN SATURATION: 98 % | HEART RATE: 110 BPM | SYSTOLIC BLOOD PRESSURE: 122 MMHG | BODY MASS INDEX: 34.72 KG/M2 | RESPIRATION RATE: 18 BRPM | HEIGHT: 64 IN | WEIGHT: 203.4 LBS

## 2024-11-26 DIAGNOSIS — F41.1 GENERALIZED ANXIETY DISORDER: ICD-10-CM

## 2024-11-26 DIAGNOSIS — E66.811 CLASS 1 OBESITY DUE TO EXCESS CALORIES WITH SERIOUS COMORBIDITY AND BODY MASS INDEX (BMI) OF 34.0 TO 34.9 IN ADULT: Primary | ICD-10-CM

## 2024-11-26 DIAGNOSIS — E28.2 POLYCYSTIC OVARY: ICD-10-CM

## 2024-11-26 DIAGNOSIS — E01.0 THYROMEGALY: ICD-10-CM

## 2024-11-26 DIAGNOSIS — E66.09 CLASS 1 OBESITY DUE TO EXCESS CALORIES WITH SERIOUS COMORBIDITY AND BODY MASS INDEX (BMI) OF 34.0 TO 34.9 IN ADULT: Primary | ICD-10-CM

## 2024-11-26 PROCEDURE — 99204 OFFICE O/P NEW MOD 45 MIN: CPT | Performed by: NURSE PRACTITIONER

## 2024-11-26 RX ORDER — TIRZEPATIDE 2.5 MG/.5ML
2.5 INJECTION, SOLUTION SUBCUTANEOUS WEEKLY
Qty: 2 ML | Refills: 0 | Status: SHIPPED | OUTPATIENT
Start: 2024-11-26 | End: 2024-12-24

## 2024-11-26 RX ORDER — TRIAMCINOLONE ACETONIDE 1 MG/G
CREAM TOPICAL
COMMUNITY
Start: 2024-09-04

## 2024-11-26 NOTE — ASSESSMENT & PLAN NOTE
Would suggest monitoring yearly while starting on GLP-1 medications given family history of thyroid cancer  Thyroid ultrasound from 3/2024 was reviewed today

## 2024-11-26 NOTE — PROGRESS NOTES
Assessment/Plan:    Class 1 obesity due to excess calories with serious comorbidity and body mass index (BMI) of 34.0 to 34.9 in adult  - Discussed options of HealthyCORE-Intensive Lifestyle Intervention Program, Very Low Calorie Diet-VLCD, and Conservative Program and the role of weight loss medications.  - Patient is interested in pursuing Conservative Program and follow up visits with medical weight management provider.  - Explained the importance of making lifestyle changes in addition to starting any anti-obesity medications.   - Initial weight loss goal of 5-10% weight loss for improved health. Weight loss can improve patient's co-morbid conditions and/or prevent weight-related complications.  - Weight is not at goal and patient has been unable to achieve a meaningful weight loss above 5% using various programs and tools for more than 6 months  - Labs reviewed from 3/2024 and 7/2024    General Recommendations:  Nutrition:  Eat breakfast daily.  Do not skip meals.      Food log (ie.) www.Sopogy.com, sparkpeople.com, loseit.com, calorieking.com, etc.     Practice mindful eating.  Be sure to set aside time to eat, eat slowly, and savor your food.     Hydration:    At least 64oz of water daily.  No sugar sweetened beverages.  No juice (eat the fruit instead).     Exercise:  Studies have shown that the ideal exercise goal is somewhere between 150 to 300 minutes of moderate intensity exercise a week.  Start with exercising 10 minutes every other day and gradually increase physical activity with a goal of at least 150 minutes of moderate intensity exercise a week, divided over at least 3 days a week.  An example of this would be exercising 30 minutes a day, 5 days a week.  Resistance training can increase muscle mass and increase our resting metabolic rate.   FULL BODY resistance training is recommended 2-3 times a week.  Do not do this on consecutive days to allow for muscle recovery.     Aim for a bare  minimum 5000 steps, even on days you do not exercise.     Monitoring:   Weigh yourself daily.  If this causes undue stress, then just weigh yourself once a week.  Weigh yourself the same time of the day with the same amount of clothing on.  Preferably this should be done after waking up, before you eat, and with no clothing or minimal clothing on.     Specific Goals:  Patient lifestyle habits were reviewed and barriers to weight loss were addressed today.  Nutrition was discussed and patient will be meeting with a dietitian to learn better nutrition structure.  She will need to work on structure for the day she is working night shift and the day she is off so she has a better plan as to how to eat.  She will also work on nutrition choices to make sure she is getting adequate protein, portioning her carbs, and snacking on healthier options.  She was encouraged to continue with daily 64 ounces of water and to avoid sugary beverages such as juice and soda as these will be contributing to her sugar and calorie intake.  Activity was discussed and she will continue with her active lifestyle but was also encouraged to consider formal exercise to help maintain muscle mass.  Medications were discussed:  Phentermine was discussed but would avoid at this time due to anxiety.  Topiramate was discussed to help with appetite suppression  GLP-1 medications were discussed and patient was interested in starting on Zepbound to help her insulin resistance as well as her weight loss journey.  Lengthy discussion was had regarding thyroid cancer and GLP-1 medications.  Patient understands the risks given her family history of thyroid cancer.  Patient was in agreement to try this medication and will closely monitor her thyroid ultrasounds yearly as well as her thyroid function.    Zepbound Instructions:    - Begin Zepbound 2.5 mg subcutaneously once a week. Dose changes may occur after 4 doses if medication is tolerated. You will be  assessed prior to each dose change to make sure you are tolerating the medication well.  - Please message me when you have 2 pens left from the prescription so there are no lapses in treatment.  - If you have been off the medication for more than 14 days please contact the office as you will need to restart the titration at the starting dose again to avoid significant side effects or adverse events.  - Visit Zepbound.com for further information/injection instructions.   -Please eat small frequent meals to help reduce nausea. Lemon water and saltine crackers may help with this.   - Side effects of Zepbound discussed: nausea, vomiting, diarrhea, and constipation. If you experience fever, nausea/vomiting, and pain radiating to your back this may be a sign of pancreatitis. Please go to the emergency room if this occurs.  - If on oral birth control a 2nd method of birth control is recommended during the 1st 8 weeks of therapy and for 4 weeks after any dosage change.   - Patient understands the side effects of the medication and proper administration. Patient agrees with the treatment plan and all questions were answered.  -Supply concerns were discussed with patient and patient voiced understanding that they will need to call with adequate time for refills to avoid any lapses in treatment.  Patient may also have to call around to different pharmacies to see if any pharmacy has the appropriate dose in stock.  -Pen demonstrated in the office today    Polycystic ovary  Will likely benefit from GLP-1 medication  May consider restarting metformin    Thyromegaly  Would suggest monitoring yearly while starting on GLP-1 medications given family history of thyroid cancer  Thyroid ultrasound from 3/2024 was reviewed today    Generalized anxiety disorder  Would avoid phentermine for weight loss until anxiety well-controlled         Deepa was seen today for consult.    Diagnoses and all orders for this visit:    Class 1 obesity  due to excess calories with serious comorbidity and body mass index (BMI) of 34.0 to 34.9 in adult  -     tirzepatide (Zepbound) 2.5 mg/0.5 mL auto-injector; Inject 0.5 mL (2.5 mg total) under the skin once a week for 28 days    Polycystic ovary    Thyromegaly    Generalized anxiety disorder         Medication consent was reviewed today and all questions were answered.  Patient agreed with all bulleted points.  Consent was signed, scanned into chart and patient was provided with a copy for their records.  Patient denies personal history of pancreatitis. Patient also denies personal and family history of medullary thyroid cancer and multiple endocrine neoplasia type 2 (MEN 2 tumor). Patient denies any history of kidney stones, seizures, or glaucoma.         Total time spent reviewing chart, interviewing patient, examining patient, discussing plan, answering all questions, and documentin min, with >50% face-to-face time spent counseling patient on nonsurgical interventions for the treatment of excess weight. Discussed in detail nonsurgical options including intensive lifestyle intervention program, very low-calorie diet program and conservative program.  Discussed the role of weight loss medications.  Counseled patient on diet behavior and exercise modification for weight loss.    Follow up in approximately 3 months with Non-Surgical Physician/Advanced Practitioner.    Subjective:   Chief Complaint   Patient presents with    Consult     Initial Consult.  Waist: 39 inches.  S/B =  2/8.       Patient ID: Deepa Darling  is a 29 y.o. female with excess weight/obesity here to pursue weight management.  Previous notes and records have been reviewed.    Past Medical History:   Diagnosis Date    Hypothyroidism      Past Surgical History:   Procedure Laterality Date    WISDOM TOOTH EXTRACTION         HPI:  Wt Readings from Last 20 Encounters:   24 92.3 kg (203 lb 6.4 oz)   24 92.1 kg (203 lb)    08/27/24 89.8 kg (198 lb)   07/23/24 91.6 kg (202 lb)   03/11/24 88.1 kg (194 lb 3.2 oz)   03/01/24 85 kg (187 lb 6.3 oz)   06/16/23 88 kg (194 lb)       Patient presents today to medical weight management office for consult.   service used: [facility] ([id number])  Patient has been struggling with her weight since her teenage years.  She was diagnosed with PCOS and hyperinsulinemia when she was 15 years old but was always active and able to maintain her weight.  When she was 18 she started to gain weight.  She lost weight when she was pregnant with her children but after stopping breast-feeding she has regained weight.  She has now found that she is very hungry a lot of the time and struggles with eating structure.  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  Patient is currently in therapy using a [employer] service from [facility].  She is working on her anxiety.  Patient has a family history of thyroid cancer both on her mother and father side.  She is unsure what type of thyroid cancer but all thyroid cancers were isolated and patient was treated without any follow-up treatment.  She has had multiple evaluations of her own thyroid including an ultrasound in March that was normal.  Patient currently has an IUD for pregnancy prevention.    Starting MWM weight: 203.4 lbs  Starting BMI: 34.91  Starting Waist Measurement: 39 in  Goal weight: 60 kilos    Obesity/Excess Weight:  Severity: Mild  Onset:  10-15 years    Modifiers: Diet and Exercise  Contributing factors: Poor Food Choices, Insufficient Physical Activity, Stress/Emotional Eating, Lack of knowledge of appropriate lifestyle changes, and Insufficient time to make appropriate lifestyle changes  Associated symptoms: comorbid conditions, fatigue, increased joint pain, decreased exercise capacity, body image issues, decreased self esteem, increased shortness of breath, decreased mobility,  "depression, inability to do certain activities, and clothes do not fit    Diet recall:  Night shift (grazing throughout the day)  B: fruit and vegetables  L: nachos   D: can of tuna  S: chocolate  Take out frequency: 3-4 times per week    Hydration: water, 1 juice, 4 sodas per week  Alcohol: rare  Smoking: no  Exercise:  has foot pain  Occupation: works in housekeeping  Sleep: not good - sleeping about 5 hours  STOP ban/8      The following portions of the patient's history were reviewed and updated as appropriate: allergies, current medications, past family history, past medical history, past social history, past surgical history, and problem list.    Family History   Problem Relation Age of Onset    Thyroid disease Mother     Hypertension Father     Hypertension Sister     Heart attack Maternal Grandfather     Thyroid cancer Paternal Grandmother     Cancer Paternal Grandfather         Cancer de prostata    Heart attack Maternal Uncle     Thyroid cancer Paternal Aunt         Review of Systems   Constitutional:  Negative for fatigue.   HENT:  Negative for sore throat.    Respiratory:  Negative for cough and shortness of breath.    Cardiovascular:  Negative for chest pain, palpitations and leg swelling.   Gastrointestinal:  Negative for abdominal pain, constipation, diarrhea and nausea.   Genitourinary:  Negative for dysuria.   Musculoskeletal:  Negative for arthralgias and back pain.   Skin:  Negative for rash.   Neurological:  Negative for headaches.   Psychiatric/Behavioral:  Negative for dysphoric mood. The patient is not nervous/anxious.        Objective:  /82 (BP Location: Left arm, Patient Position: Sitting, Cuff Size: Adult)   Pulse (!) 110   Resp 18   Ht 5' 4\" (1.626 m)   Wt 92.3 kg (203 lb 6.4 oz)   SpO2 98%   BMI 34.91 kg/m²     Physical Exam  Vitals and nursing note reviewed.   Constitutional:       Appearance: Normal appearance. She is obese.   HENT:      Head: Normocephalic.   Pulmonary: "      Effort: Pulmonary effort is normal.   Neurological:      General: No focal deficit present.      Mental Status: She is alert and oriented to person, place, and time.   Psychiatric:         Mood and Affect: Mood normal.         Behavior: Behavior normal.         Thought Content: Thought content normal.         Judgment: Judgment normal.              Labs and Imaging  Recent labs and imaging have been personally reviewed.  Lab Results   Component Value Date    WBC 8.30 03/16/2024    HGB 14.6 03/16/2024    HCT 46.3 (H) 03/16/2024    MCV 87 03/16/2024     03/16/2024     Lab Results   Component Value Date    SODIUM 137 12/31/2023    K 3.2 (L) 12/31/2023     12/31/2023    CO2 25 12/31/2023    AGAP 9 12/31/2023    BUN 12 12/31/2023    CREATININE 0.74 12/31/2023    GLUC 81 12/31/2023    CALCIUM 9.1 12/31/2023    AST 14 12/31/2023    ALT 13 12/31/2023    ALKPHOS 77 12/31/2023    TP 7.4 12/31/2023    TBILI 0.73 12/31/2023    EGFR 110 12/31/2023     Lab Results   Component Value Date    HGBA1C 5.3 07/13/2024     Lab Results   Component Value Date    LBY2CTUGMYES 1.016 03/16/2024     Lab Results   Component Value Date    CHOLESTEROL 154 07/13/2024     Lab Results   Component Value Date    HDL 52 07/13/2024     Lab Results   Component Value Date    TRIG 57 07/13/2024     Lab Results   Component Value Date    LDLCALC 91 07/13/2024

## 2024-11-26 NOTE — ASSESSMENT & PLAN NOTE
- Discussed options of HealthyCORE-Intensive Lifestyle Intervention Program, Very Low Calorie Diet-VLCD, and Conservative Program and the role of weight loss medications.  - Patient is interested in pursuing Conservative Program and follow up visits with medical weight management provider.  - Explained the importance of making lifestyle changes in addition to starting any anti-obesity medications.   - Initial weight loss goal of 5-10% weight loss for improved health. Weight loss can improve patient's co-morbid conditions and/or prevent weight-related complications.  - Weight is not at goal and patient has been unable to achieve a meaningful weight loss above 5% using various programs and tools for more than 6 months  - Labs reviewed from 3/2024 and 7/2024    General Recommendations:  Nutrition:  Eat breakfast daily.  Do not skip meals.      Food log (ie.) www.Farmol.com, sparkpeople.com, loseit.com, calorieking.com, etc.     Practice mindful eating.  Be sure to set aside time to eat, eat slowly, and savor your food.     Hydration:    At least 64oz of water daily.  No sugar sweetened beverages.  No juice (eat the fruit instead).     Exercise:  Studies have shown that the ideal exercise goal is somewhere between 150 to 300 minutes of moderate intensity exercise a week.  Start with exercising 10 minutes every other day and gradually increase physical activity with a goal of at least 150 minutes of moderate intensity exercise a week, divided over at least 3 days a week.  An example of this would be exercising 30 minutes a day, 5 days a week.  Resistance training can increase muscle mass and increase our resting metabolic rate.   FULL BODY resistance training is recommended 2-3 times a week.  Do not do this on consecutive days to allow for muscle recovery.     Aim for a bare minimum 5000 steps, even on days you do not exercise.     Monitoring:   Weigh yourself daily.  If this causes undue stress, then just weigh  yourself once a week.  Weigh yourself the same time of the day with the same amount of clothing on.  Preferably this should be done after waking up, before you eat, and with no clothing or minimal clothing on.     Specific Goals:  Patient lifestyle habits were reviewed and barriers to weight loss were addressed today.  Nutrition was discussed and patient will be meeting with a dietitian to learn better nutrition structure.  She will need to work on structure for the day she is working night shift and the day she is off so she has a better plan as to how to eat.  She will also work on nutrition choices to make sure she is getting adequate protein, portioning her carbs, and snacking on healthier options.  She was encouraged to continue with daily 64 ounces of water and to avoid sugary beverages such as juice and soda as these will be contributing to her sugar and calorie intake.  Activity was discussed and she will continue with her active lifestyle but was also encouraged to consider formal exercise to help maintain muscle mass.  Medications were discussed:  Phentermine was discussed but would avoid at this time due to anxiety.  Topiramate was discussed to help with appetite suppression  GLP-1 medications were discussed and patient was interested in starting on Zepbound to help her insulin resistance as well as her weight loss journey.  Lengthy discussion was had regarding thyroid cancer and GLP-1 medications.  Patient understands the risks given her family history of thyroid cancer.  Patient was in agreement to try this medication and will closely monitor her thyroid ultrasounds yearly as well as her thyroid function.    Zepbound Instructions:    - Begin Zepbound 2.5 mg subcutaneously once a week. Dose changes may occur after 4 doses if medication is tolerated. You will be assessed prior to each dose change to make sure you are tolerating the medication well.  - Please message me when you have 2 pens left from the  prescription so there are no lapses in treatment.  - If you have been off the medication for more than 14 days please contact the office as you will need to restart the titration at the starting dose again to avoid significant side effects or adverse events.  - Visit Zepbound.com for further information/injection instructions.   -Please eat small frequent meals to help reduce nausea. Lemon water and saltine crackers may help with this.   - Side effects of Zepbound discussed: nausea, vomiting, diarrhea, and constipation. If you experience fever, nausea/vomiting, and pain radiating to your back this may be a sign of pancreatitis. Please go to the emergency room if this occurs.  - If on oral birth control a 2nd method of birth control is recommended during the 1st 8 weeks of therapy and for 4 weeks after any dosage change.   - Patient understands the side effects of the medication and proper administration. Patient agrees with the treatment plan and all questions were answered.  -Supply concerns were discussed with patient and patient voiced understanding that they will need to call with adequate time for refills to avoid any lapses in treatment.  Patient may also have to call around to different pharmacies to see if any pharmacy has the appropriate dose in stock.  -Pen demonstrated in the office today

## 2024-11-27 NOTE — TELEPHONE ENCOUNTER
Zepbound Approved 11/26/24-11/26/25.  Pharmacy notified via voicemail.  Patient notified via Onitt.

## 2024-12-07 ENCOUNTER — APPOINTMENT (OUTPATIENT)
Dept: LAB | Facility: HOSPITAL | Age: 29
End: 2024-12-07
Attending: FAMILY MEDICINE
Payer: COMMERCIAL

## 2024-12-07 DIAGNOSIS — M25.50 MULTIPLE JOINT PAIN: ICD-10-CM

## 2024-12-07 DIAGNOSIS — E87.6 HYPOKALEMIA: ICD-10-CM

## 2024-12-07 DIAGNOSIS — Z00.00 ANNUAL PHYSICAL EXAM: ICD-10-CM

## 2024-12-07 DIAGNOSIS — Z82.49 FAMILY HISTORY OF PREMATURE CAD: ICD-10-CM

## 2024-12-07 LAB
ALBUMIN SERPL BCG-MCNC: 4.5 G/DL (ref 3.5–5)
ALP SERPL-CCNC: 72 U/L (ref 34–104)
ALT SERPL W P-5'-P-CCNC: 16 U/L (ref 7–52)
ANION GAP SERPL CALCULATED.3IONS-SCNC: 10 MMOL/L (ref 4–13)
AST SERPL W P-5'-P-CCNC: 18 U/L (ref 13–39)
BILIRUB SERPL-MCNC: 0.5 MG/DL (ref 0.2–1)
BUN SERPL-MCNC: 11 MG/DL (ref 5–25)
CALCIUM SERPL-MCNC: 9.4 MG/DL (ref 8.4–10.2)
CHLORIDE SERPL-SCNC: 103 MMOL/L (ref 96–108)
CO2 SERPL-SCNC: 24 MMOL/L (ref 21–32)
CREAT SERPL-MCNC: 0.71 MG/DL (ref 0.6–1.3)
ERYTHROCYTE [SEDIMENTATION RATE] IN BLOOD: 24 MM/HOUR (ref 0–19)
GFR SERPL CREATININE-BSD FRML MDRD: 115 ML/MIN/1.73SQ M
GLUCOSE P FAST SERPL-MCNC: 78 MG/DL (ref 65–99)
POTASSIUM SERPL-SCNC: 4 MMOL/L (ref 3.5–5.3)
PROT SERPL-MCNC: 7.5 G/DL (ref 6.4–8.4)
SODIUM SERPL-SCNC: 137 MMOL/L (ref 135–147)

## 2024-12-07 PROCEDURE — 36415 COLL VENOUS BLD VENIPUNCTURE: CPT

## 2024-12-07 PROCEDURE — 86618 LYME DISEASE ANTIBODY: CPT

## 2024-12-07 PROCEDURE — 85652 RBC SED RATE AUTOMATED: CPT

## 2024-12-07 PROCEDURE — 80053 COMPREHEN METABOLIC PANEL: CPT

## 2024-12-07 PROCEDURE — 86038 ANTINUCLEAR ANTIBODIES: CPT

## 2024-12-07 PROCEDURE — 83695 ASSAY OF LIPOPROTEIN(A): CPT

## 2024-12-07 PROCEDURE — 86430 RHEUMATOID FACTOR TEST QUAL: CPT

## 2024-12-08 LAB
ANA SER QL IA: NEGATIVE
B BURGDOR IGG+IGM SER QL IA: NEGATIVE
RHEUMATOID FACT SER QL LA: NEGATIVE

## 2024-12-09 LAB — LPA SERPL-SCNC: 63.4 NMOL/L

## 2024-12-11 ENCOUNTER — RESULTS FOLLOW-UP (OUTPATIENT)
Dept: CARDIOLOGY CLINIC | Facility: CLINIC | Age: 29
End: 2024-12-11

## 2024-12-17 NOTE — PROGRESS NOTES
"Weight Management Medical Nutrition Assessment  Deepa is here today for  employee menu planning. Current weight 192.6#.  services used during today's appointment [#[id number]]. Patient has struggled with her weight over the last 10 years. She was diagnosed with PCOS and hyperinsulinemia when she was 15 years old but was always active and able to maintain her weight. Around age 18-20, patient started to gain weight (~10 kilos). Patient reports sweet cravings and insatiable hunger. Since starting Zepbound, patient has lost 10.8#. She reports significant reduction in not only her cravings and hunger but reports her anxiety and depression have improved along with her concentration, focus, and mood. She has been reducing her portions, prioritizing protein, and has eliminated sugar sweetened beverages. Low-calorie meal plan reviewed. Recommend patient continue with dietary changes with the addition of protein and fiber rich snacks to prevent large gaps and meet nutrient needs. Patient will continue with her active lifestyle with the addition of strength training via resistance bands. Will start with 1-2x/week and advance to 3x/week. Provided full body resistance band workout to get started. RD card provided and options for follow up reviewed.      Likes: eggs, cheese, nuts, Greek yogurt, tuna     Patient seen by Medical Provider in past 6 months:  yes  Requested to schedule appointment with Medical Provider: No    Anthropometric Measurements  Provider Start Weight (#): 203.4# (24)  Current Weight (#): 192.6#  TBW % Change from start weight: 5.3%  IBW: 120# (64\")  Goal Weight (#): ST# LT#    Weight Loss History  Previous weight loss attempts: Exercise  Self Created Diets (Portion Control, Healthy Food Choices, etc.)    Food and Nutrition Related History  Wake up: 12-4 pm    Bed Time: 9 am   Sleep quality: not good - sleeping about 3-5 hours depending on son's schedule   Works 11 pm-7:30 " am    Dietary Recall (since starting medication)  Night shift (grazing throughout the day)  Breakfast (2-3 hours after waking): HB eggs (or lightly fried) w/ jimenez Or yogurt w/ fruit Or protein pancakes   Snack: --  Lunch (8-11 pm): 5-6 oz meat/chicken/pork/fish w/ 1/2 cup rice/quinoa + vegetables/salad  Snack: previously chocolate or bread or cookies (unsure if this was a result of anxiety), now --   Dinner (5-6 am): Greek yogurt Or apple    Snack: --    Beverages: water, sparkling water (more recently), juice (eliminated), soda (eliminated)  Volume of beverage intake: more recently, 64 oz water     Weekends: eats a little more but not much more  Cravings: sweets  Trouble area of day: no time of day, but when getting little sleep, may snack more due to increased hunger     Frequency of Eating out: 2-3x/week   Food restrictions: iodine food allergy   Cooking: self  Food Shopping: self     Occupation: works in housekeeping    Physical Activity  Activity: No formal routine (on feet at work) [has bands and weights at home]   Frequency: --  Physical limitations/barriers to exercise: foot pain     Estimated Needs  [facility] Energy Needs (needs at 203.4#)  BMR: 1,631 kcal  Maintenance calories (sedentary): 1,957 kcal  1-1.5#/week loss (sedentary): 1,207-1,457 kcal  1-2#/week loss (light activity): 1,242-1,742 kcal    Protein: 65-82 grams (1.2-1.5g/kg IBW)  Fluid: 64 ounces (35mL/kg IBW)    Nutrition Diagnosis  Yes;    Overweight/obesity related to Excess energy intake as evidenced by BMI more than normative standard for age and sex (obesity-grade I 30-34.9)     Nutrition Intervention    Nutrition Prescription  Calories: 1,200-1,400 kcal  Protein: 65-82 g  Fluid: 64 ounces    Meal Plan (Jagdeep/Pro)  Breakfast: 300-350/15-20  Snack: 100-150/5  Lunch: 400-450/35  Snack: 100-150/5  Dinner: 250-300/15-25  Snack: --    Nutrition Education  Calorie controlled menu  Lean protein food choices  Protein and fiber  snacks  Appropriate portions  Appropriate meal spacing    Nutrition Counseling  Strategies: motivational interviewing, goal setting, personalized meal planning    Monitoring and Evaluation:    Evaluation criteria  Energy and protein intake  Fluid intake  Monitor weekly weight  Meal planning/preparation  Calorie tracking/Measuring  Portion control   Physical activity     Barriers to change:none  Readiness to change: Action:  (Changing behavior)  Comprehension: very good  Expected Compliance: very good

## 2024-12-18 ENCOUNTER — CLINICAL SUPPORT (OUTPATIENT)
Dept: BARIATRICS | Facility: CLINIC | Age: 29
End: 2024-12-18
Payer: COMMERCIAL

## 2024-12-18 VITALS — BODY MASS INDEX: 32.88 KG/M2 | WEIGHT: 192.6 LBS | HEIGHT: 64 IN

## 2024-12-18 DIAGNOSIS — E66.811 CLASS 1 OBESITY DUE TO EXCESS CALORIES WITH BODY MASS INDEX (BMI) OF 33.0 TO 33.9 IN ADULT, UNSPECIFIED WHETHER SERIOUS COMORBIDITY PRESENT: Primary | ICD-10-CM

## 2024-12-18 DIAGNOSIS — E66.811 CLASS 1 OBESITY DUE TO EXCESS CALORIES WITH SERIOUS COMORBIDITY AND BODY MASS INDEX (BMI) OF 34.0 TO 34.9 IN ADULT: ICD-10-CM

## 2024-12-18 DIAGNOSIS — E66.09 CLASS 1 OBESITY DUE TO EXCESS CALORIES WITH BODY MASS INDEX (BMI) OF 33.0 TO 33.9 IN ADULT, UNSPECIFIED WHETHER SERIOUS COMORBIDITY PRESENT: Primary | ICD-10-CM

## 2024-12-18 DIAGNOSIS — E66.09 CLASS 1 OBESITY DUE TO EXCESS CALORIES WITH SERIOUS COMORBIDITY AND BODY MASS INDEX (BMI) OF 34.0 TO 34.9 IN ADULT: ICD-10-CM

## 2024-12-18 PROCEDURE — S9470 NUTRITIONAL COUNSELING, DIET: HCPCS

## 2024-12-18 PROCEDURE — RECHECK

## 2024-12-19 RX ORDER — TIRZEPATIDE 2.5 MG/.5ML
2.5 INJECTION, SOLUTION SUBCUTANEOUS WEEKLY
Qty: 2 ML | Refills: 0 | Status: SHIPPED | OUTPATIENT
Start: 2024-12-19 | End: 2025-01-16

## 2024-12-27 ENCOUNTER — TELEPHONE (OUTPATIENT)
Age: 29
End: 2024-12-27

## 2024-12-27 NOTE — TELEPHONE ENCOUNTER
Forwarding to PA team for review.     Patient called the [facility]line stating that she spoke to her insurance company and zepound 2.5 mg needs a prior Auth.

## 2024-12-30 ENCOUNTER — HOSPITAL ENCOUNTER (OUTPATIENT)
Dept: NON INVASIVE DIAGNOSTICS | Facility: HOSPITAL | Age: 29
Discharge: HOME | End: 2024-12-30
Attending: STUDENT IN AN ORGANIZED HEALTH CARE EDUCATION/TRAINING PROGRAM
Payer: COMMERCIAL

## 2024-12-30 VITALS
HEART RATE: 95 BPM | HEIGHT: 64 IN | DIASTOLIC BLOOD PRESSURE: 82 MMHG | WEIGHT: 192 LBS | SYSTOLIC BLOOD PRESSURE: 122 MMHG | BODY MASS INDEX: 32.78 KG/M2

## 2024-12-30 VITALS
RESPIRATION RATE: 16 BRPM | WEIGHT: 192 LBS | BODY MASS INDEX: 32.78 KG/M2 | SYSTOLIC BLOOD PRESSURE: 130 MMHG | DIASTOLIC BLOOD PRESSURE: 80 MMHG | HEART RATE: 99 BPM | OXYGEN SATURATION: 100 % | HEIGHT: 64 IN

## 2024-12-30 DIAGNOSIS — R07.9 CHEST PAIN, UNSPECIFIED TYPE: ICD-10-CM

## 2024-12-30 LAB
AORTIC ROOT: 2.7 CM
APICAL FOUR CHAMBER EJECTION FRACTION: 65 %
ASCENDING AORTA: 2.2 CM
BSA FOR ECHO PROCEDURE: 1.92 M2
E WAVE DECELERATION TIME: 135 MS
E/A RATIO: 1.23
FRACTIONAL SHORTENING: 49 (ref 28–44)
INTERVENTRICULAR SEPTUM IN DIASTOLE (PARASTERNAL SHORT AXIS VIEW): 0.9 CM
INTERVENTRICULAR SEPTUM: 0.9 CM (ref 0.6–1.1)
LAAS-AP2: 13.1 CM2
LAAS-AP4: 12.8 CM2
LEFT ATRIUM SIZE: 3.8 CM
LEFT ATRIUM VOLUME (MOD BIPLANE): 30 ML
LEFT ATRIUM VOLUME INDEX (MOD BIPLANE): 15.5 ML/M2
LEFT INTERNAL DIMENSION IN SYSTOLE: 2.1 CM (ref 2.1–4)
LEFT VENTRICULAR INTERNAL DIMENSION IN DIASTOLE: 4.1 CM (ref 3.5–6)
LEFT VENTRICULAR POSTERIOR WALL IN END DIASTOLE: 0.9 CM
LEFT VENTRICULAR STROKE VOLUME: 59 ML
LVSV (TEICH): 59 ML
MV E'TISSUE VEL-SEP: 14 CM/S
MV PEAK A VEL: 0.61 M/S
MV PEAK E VEL: 75 CM/S
MV STENOSIS PRESSURE HALF TIME: 39 MS
MV VALVE AREA P 1/2 METHOD: 5.64
RA PRESSURE ESTIMATED: 3 MMHG
RIGHT ATRIUM AREA SYSTOLE A4C: 11.9 CM2
RIGHT VENTRICLE ID DIMENSION: 2.7 CM
RV PSP: 23 MMHG
SL CV LEFT ATRIUM LENGTH A2C: 4.3 CM
SL CV LV EF: 65
SL CV PED ECHO LEFT VENTRICLE DIASTOLIC VOLUME (MOD BIPLANE) 2D: 74 ML
SL CV PED ECHO LEFT VENTRICLE SYSTOLIC VOLUME (MOD BIPLANE) 2D: 15 ML
TR MAX PG: 20 MMHG
TR PEAK VELOCITY: 2.2 M/S
TRICUSPID ANNULAR PLANE SYSTOLIC EXCURSION: 2.5 CM
TRICUSPID VALVE PEAK REGURGITATION VELOCITY: 2.22 M/S

## 2024-12-30 PROCEDURE — 93306 TTE W/DOPPLER COMPLETE: CPT

## 2024-12-30 PROCEDURE — 93016 CV STRESS TEST SUPVJ ONLY: CPT | Performed by: INTERNAL MEDICINE

## 2024-12-30 PROCEDURE — 93306 TTE W/DOPPLER COMPLETE: CPT | Performed by: INTERNAL MEDICINE

## 2024-12-30 PROCEDURE — 93017 CV STRESS TEST TRACING ONLY: CPT

## 2024-12-30 PROCEDURE — 93018 CV STRESS TEST I&R ONLY: CPT | Performed by: INTERNAL MEDICINE

## 2024-12-31 LAB
BASELINE ST DEPRESSION: 0.5 MM
CHEST PAIN STATEMENT: NORMAL
MAX DIASTOLIC BP: 86 MMHG
MAX HR PERCENT: 90 %
MAX HR: 171 BPM
MAX PREDICTED HEART RATE: 191 BPM
PROTOCOL NAME: NORMAL
RATE PRESSURE PRODUCT: NORMAL
SL CV STRESS RECOVERY BP: NORMAL MMHG
SL CV STRESS RECOVERY HR: 114 BPM
SL CV STRESS RECOVERY O2 SAT: 100 %
STRESS ANGINA INDEX: 1
STRESS BASELINE BP: NORMAL MMHG
STRESS BASELINE HR: 99 BPM
STRESS DUKE TREADMILL SCORE: -3
STRESS O2 SAT REST: 95 %
STRESS PEAK HR: 171 BPM
STRESS POST ESTIMATED WORKLOAD: 10.1 METS
STRESS POST EXERCISE DUR MIN: 9 MIN
STRESS POST EXERCISE DUR MIN: 9 MIN
STRESS POST EXERCISE DUR SEC: 0 SEC
STRESS POST EXERCISE DUR SEC: 0 SEC
STRESS POST O2 SAT PEAK: 100 %
STRESS POST PEAK BP: 162 MMHG
STRESS POST PEAK HR: 173 BPM
STRESS POST PEAK SYSTOLIC BP: 162 MMHG
STRESS ST DEPRESSION: 1.5 MM
TARGET HR FORMULA: NORMAL
TEST INDICATION: NORMAL

## 2025-01-02 DIAGNOSIS — E66.09 CLASS 1 OBESITY DUE TO EXCESS CALORIES WITH SERIOUS COMORBIDITY AND BODY MASS INDEX (BMI) OF 34.0 TO 34.9 IN ADULT: Primary | ICD-10-CM

## 2025-01-02 DIAGNOSIS — E66.811 CLASS 1 OBESITY DUE TO EXCESS CALORIES WITH SERIOUS COMORBIDITY AND BODY MASS INDEX (BMI) OF 34.0 TO 34.9 IN ADULT: Primary | ICD-10-CM

## 2025-01-02 RX ORDER — TIRZEPATIDE 5 MG/.5ML
5 INJECTION, SOLUTION SUBCUTANEOUS WEEKLY
Qty: 2 ML | Refills: 1 | Status: SHIPPED | OUTPATIENT
Start: 2025-01-02 | End: 2025-02-27

## 2025-01-07 ENCOUNTER — OFFICE VISIT (OUTPATIENT)
Dept: CARDIOLOGY CLINIC | Facility: CLINIC | Age: 30
End: 2025-01-07
Payer: COMMERCIAL

## 2025-01-07 VITALS
OXYGEN SATURATION: 98 % | WEIGHT: 189 LBS | HEART RATE: 94 BPM | SYSTOLIC BLOOD PRESSURE: 108 MMHG | DIASTOLIC BLOOD PRESSURE: 68 MMHG | BODY MASS INDEX: 32.27 KG/M2 | HEIGHT: 64 IN

## 2025-01-07 DIAGNOSIS — R07.89 OTHER CHEST PAIN: Primary | ICD-10-CM

## 2025-01-07 PROCEDURE — 99214 OFFICE O/P EST MOD 30 MIN: CPT | Performed by: STUDENT IN AN ORGANIZED HEALTH CARE EDUCATION/TRAINING PROGRAM

## 2025-01-07 RX ORDER — METOPROLOL TARTRATE 50 MG
50 TABLET ORAL SEE ADMIN INSTRUCTIONS
Qty: 2 TABLET | Refills: 0 | Status: SHIPPED | OUTPATIENT
Start: 2025-01-07

## 2025-01-07 NOTE — PROGRESS NOTES
[facility]  [address]  [place] [address]  Phone#  [phone]  Fax#  [phone]  [facility] Follow-up Visit             NAME: Deepa Darling  AGE: 29 y.o. SEX: female   : 1995   MRN: [id number]    DATE: 2025  TIME: 2:00 PM      Assessment & Plan    Chest pain, unspecified type  Initially reported that her chest pain is substernal and occasionally brought on with exertion.  She did develop reproducible chest pain during her exercise stress test.  It was equivocal due to upsloping ST depressions and Little treadmill score of -3.  She noticed exertional chest pain.  Will do further evaluation with a coronary CT in the recommendation.  Obtain coronary CTA    Family history of premature CAD  Reports possible family history of early coronary artery disease versus sudden cardiac death.  LP(a) normal  Recommend close monitoring of lipids  Lifestyle modifications (resources provided)      Hypokalemia, resolved    Possible ADHD   Will need to rule out ischemia before start any stimulant medications      Dizziness, fatigue and headache are all side effects of levonorgestrel    Follow up 6 mo for result review      -----    Chief Complaint   Patient presents with    Follow-up     4 week follow up after testing    Chest Pain     Chest pressure in the center of chest     Palpitations     Sometimes feels heart beats really fast; happens when relaxing as well. Thinks it could be anxiety/ trauma     Shortness of Breath     Sitting still and moving around     Edema     Swelling sometimes in the feet; sometimes feels tingly     Feels pain in leg veins; wears compression socks        HPI:    Deepa Darling is a 29 y.o.-year-old female who presents to the cardiology clinic for follow up for the above-listed problems.     Past medical history is significant for PCOS and anxiety. I reviewed her records in epic. She was evaluated on 2023 for  chest pain in the emergency room. At that time, her ECG did not show evidence of ischemia and she had negative high-sensitivity troponin.     PMH:   First evaluation 11/20/24 for chest pain. She states that she has been having issues with chest pain that started approximately 3 years ago while she was still living in [place].  Reports that she does not think she had a thorough medical evaluation there and was not happy with her care.     She develops a substernal pressure and squeezing-like chest discomfort and pain that occasionally radiates up her neck and down her right arm.  Occasionally occurs at night and wakes her up, but other times the pain is exertional.  She does note increased frequency of pain when she is dealing with stress.  She also notes poor sleep.  Notes the pain is occasionally associated with leg numbness and pain.  She states that she occasionally feels her heart is racing during these events.    Used GrabCAD interpretor [id number].    I reviewed her exercise stress test. It was equivocal due to non-specific ST depressions (upsloping) with reproducible chest pain. Since test, she now notices chest pain with exertion while trying to exercise. No other changes.    HR 94 bpm  Chest pain continues 2-3 x per month    Being worked up for ADHD.      I personally reviewed the patient's pertinent cardiac imaging and labs in Epic:    12/07/24 Lp(a) normal  12/30/24 TTE - LVEF 65%, normal  12/30/24 ETT - Duke Treadmill Score =-3 , equivocal (upsloping ST with baseline ST ST depressions)    7/13/24 Lipid panel showed total cholesterol 154, HDL 52, TG 57, LDL 91   7/13/24 hgb A1c 5.3%  3/16/24 TSH 1.0  3/16/24 CBC normal WBC, and platelet counts; high hct and RBC count     12/31/23 ECG - NSR, rightward axis  11/20/24 ECG -sinus tachycardia with nonspecific ST normalities  -----    Past history, family history, social history, current medications, vital signs, recent lab and imaging studies and  prior  cardiology studies reviewed independently on this visit.    Allergies   Allergen Reactions    Iodine - Food Allergy Hives       Current Outpatient Medications:     levonorgestrel (Liletta, 52 MG,) 20.1 mcg/day IUD, 1 each by Intrauterine route Once every 8 years, Disp: , Rfl:     famotidine (PEPCID) 40 MG tablet, Take 1 tablet (40 mg total) by mouth daily at bedtime (Patient not taking: Reported on 11/26/2024), Disp: 90 tablet, Rfl: 1    fexofenadine (ALLEGRA ODT) 30 MG disintegrating tablet, Take 1 tablet (30 mg total) by mouth daily (Patient not taking: Reported on 11/26/2024), Disp: 90 tablet, Rfl: 1    meloxicam (MOBIC) 15 mg tablet, Take 1 tablet (15 mg total) by mouth daily as needed for moderate pain (Patient not taking: Reported on 11/26/2024), Disp: 90 tablet, Rfl: 1    tirzepatide (Zepbound) 5 mg/0.5 mL auto-injector, Inject 0.5 mL (5 mg total) under the skin once a week, Disp: 2 mL, Rfl: 1    triamcinolone (KENALOG) 0.1 % cream, APPLY TO AFFECTED AREAS OF BACK AND ARMS TWICE DAILY (Patient not taking: Reported on 1/7/2025), Disp: , Rfl:     Review of Systems   Respiratory:  Positive for chest tightness (exertional).    Psychiatric/Behavioral:  The patient is nervous/anxious.        Objective:     Vitals:    01/07/25 1312   BP: 108/68   Pulse: 94   SpO2: 98%     Wt Readings from Last 3 Encounters:   01/07/25 85.7 kg (189 lb)   12/30/24 87.1 kg (192 lb)   12/30/24 87.1 kg (192 lb)     Pulse Readings from Last 3 Encounters:   01/07/25 94   12/30/24 99   12/30/24 95     BP Readings from Last 3 Encounters:   01/07/25 108/68   12/30/24 130/80   12/30/24 122/82     Physical Exam  Vitals reviewed.   Constitutional:       General: She is not in acute distress.     Appearance: She is well-developed.   HENT:      Head: Normocephalic and atraumatic.   Eyes:      Conjunctiva/sclera: Conjunctivae normal.   Cardiovascular:      Rate and Rhythm: Normal rate and regular rhythm.      Heart sounds: No murmur  "heard.  Pulmonary:      Effort: Pulmonary effort is normal. No respiratory distress.      Breath sounds: Normal breath sounds.   Musculoskeletal:         General: No swelling.      Cervical back: Neck supple.   Skin:     General: Skin is warm and dry.   Neurological:      Mental Status: She is alert.         Pertinent Laboratory/Diagnostic Studies:    Laboratory studies reviewed personally by Helga Cummings MD    BMP:   Lab Results   Component Value Date    SODIUM 137 12/07/2024    K 4.0 12/07/2024     12/07/2024    CO2 24 12/07/2024    BUN 11 12/07/2024    CREATININE 0.71 12/07/2024    GLUC 81 12/31/2023    CALCIUM 9.4 12/07/2024     CBC:  Lab Results   Component Value Date    WBC 8.30 03/16/2024    HGB 14.6 03/16/2024    HCT 46.3 (H) 03/16/2024    MCV 87 03/16/2024     03/16/2024     Lipid Profile:   Lab Results   Component Value Date    HDL 52 07/13/2024     Lab Results   Component Value Date    LDLCALC 91 07/13/2024     Lab Results   Component Value Date    TRIG 57 07/13/2024      Other labs:  Lab Results   Component Value Date    HTP1AWMGXFHS 1.016 03/16/2024     Lab Results   Component Value Date    ALT 16 12/07/2024    AST 18 12/07/2024     Lab Results   Component Value Date    HGBA1C 5.3 07/13/2024         Imaging Studies:     Pertinent imaging studies and cardiac studies were independently reviewed on this visit and findings summarized.    Helga Cummings MD, PhD    Portions of the record may have been created with voice recognition software.  Occasional wrong word or \"sound alike\" substitutions may have occurred due to the inherent limitations of voice recognition software.  Read the chart carefully and recognize, using context, where substitutions have occurred. Please reach out to me directly for any clarifications.   "

## 2025-01-17 ENCOUNTER — RESULTS FOLLOW-UP (OUTPATIENT)
Dept: CARDIOLOGY CLINIC | Facility: CLINIC | Age: 30
End: 2025-01-17

## 2025-01-17 ENCOUNTER — HOSPITAL ENCOUNTER (OUTPATIENT)
Dept: CT IMAGING | Facility: HOSPITAL | Age: 30
End: 2025-01-17
Attending: STUDENT IN AN ORGANIZED HEALTH CARE EDUCATION/TRAINING PROGRAM
Payer: COMMERCIAL

## 2025-01-17 VITALS — SYSTOLIC BLOOD PRESSURE: 104 MMHG | DIASTOLIC BLOOD PRESSURE: 57 MMHG | HEART RATE: 77 BPM

## 2025-01-17 DIAGNOSIS — R07.89 OTHER CHEST PAIN: ICD-10-CM

## 2025-01-17 PROCEDURE — 75574 CT ANGIO HRT W/3D IMAGE: CPT

## 2025-01-17 PROCEDURE — 75580 N-INVAS EST C FFR SW ALY CTA: CPT

## 2025-01-17 RX ORDER — NITROGLYCERIN 0.4 MG/1
0.8 TABLET SUBLINGUAL ONCE
Status: COMPLETED | OUTPATIENT
Start: 2025-01-17 | End: 2025-01-17

## 2025-01-17 RX ORDER — METOPROLOL TARTRATE 1 MG/ML
5 INJECTION, SOLUTION INTRAVENOUS
Status: DISCONTINUED | OUTPATIENT
Start: 2025-01-17 | End: 2025-01-21 | Stop reason: HOSPADM

## 2025-01-17 RX ADMIN — METOPROLOL TARTRATE 2.5 MG: 5 INJECTION INTRAVENOUS at 14:05

## 2025-01-17 RX ADMIN — METOPROLOL TARTRATE 2.5 MG: 5 INJECTION INTRAVENOUS at 13:59

## 2025-01-17 RX ADMIN — NITROGLYCERIN 0.8 MG: 0.4 TABLET SUBLINGUAL at 14:18

## 2025-01-17 RX ADMIN — METOPROLOL TARTRATE 5 MG: 5 INJECTION INTRAVENOUS at 13:56

## 2025-01-17 RX ADMIN — IOHEXOL 94 ML: 350 INJECTION, SOLUTION INTRAVENOUS at 14:26

## 2025-01-17 NOTE — NURSING NOTE
Patient arrived for coronary CT angiography. Test education reviewed with patient. Medications and allergies verified. Pt denies PDE5 inhibitor use. Patient took lopressor as instructed by cardiology. Patient given metoprolol 50mg to reach target HR. SL nitro given per protocol. See vitals flowsheet. Tolerated test well. Instructed to increase fluid intake remainder of day. Vitals stable, patient asymptomatic upon departure.

## 2025-02-06 DIAGNOSIS — E66.09 CLASS 1 OBESITY DUE TO EXCESS CALORIES WITH SERIOUS COMORBIDITY AND BODY MASS INDEX (BMI) OF 34.0 TO 34.9 IN ADULT: ICD-10-CM

## 2025-02-06 DIAGNOSIS — R21 RASH: Primary | ICD-10-CM

## 2025-02-06 DIAGNOSIS — E66.811 CLASS 1 OBESITY DUE TO EXCESS CALORIES WITH SERIOUS COMORBIDITY AND BODY MASS INDEX (BMI) OF 34.0 TO 34.9 IN ADULT: ICD-10-CM

## 2025-02-07 RX ORDER — TRIAMCINOLONE ACETONIDE 1 MG/G
CREAM TOPICAL 2 TIMES DAILY
Qty: 15 G | Refills: 1 | Status: SHIPPED | OUTPATIENT
Start: 2025-02-07

## 2025-02-07 RX ORDER — TIRZEPATIDE 5 MG/.5ML
5 INJECTION, SOLUTION SUBCUTANEOUS WEEKLY
Qty: 2 ML | Refills: 0 | Status: SHIPPED | OUTPATIENT
Start: 2025-02-07 | End: 2025-03-07

## 2025-02-10 ENCOUNTER — OFFICE VISIT (OUTPATIENT)
Dept: VASCULAR SURGERY | Facility: CLINIC | Age: 30
End: 2025-02-10
Payer: COMMERCIAL

## 2025-02-10 VITALS
OXYGEN SATURATION: 97 % | TEMPERATURE: 98.6 F | WEIGHT: 178.6 LBS | DIASTOLIC BLOOD PRESSURE: 60 MMHG | HEART RATE: 98 BPM | SYSTOLIC BLOOD PRESSURE: 104 MMHG | HEIGHT: 64 IN | BODY MASS INDEX: 30.49 KG/M2

## 2025-02-10 DIAGNOSIS — I83.813 VARICOSE VEINS OF BOTH LOWER EXTREMITIES WITH PAIN: Primary | ICD-10-CM

## 2025-02-10 DIAGNOSIS — I83.90 ASYMPTOMATIC VARICOSE VEINS OF UNSPECIFIED LOWER EXTREMITY: ICD-10-CM

## 2025-02-10 PROCEDURE — 99203 OFFICE O/P NEW LOW 30 MIN: CPT | Performed by: NURSE PRACTITIONER

## 2025-02-10 NOTE — ASSESSMENT & PLAN NOTE
29-year-old female with anxiety, bilateral lower extremity spider veins with a remote history of sclerotherapy injections in [facility].    -No significant lower extremity varicosities.  Large cluster of spider/reticular veins left lateral thigh and faint telangiectasia left medial distal lower leg.  Reticular veins popliteal fossa. No edema.  No venous stasis changes  -We discussed treatment for spider veins is injection sclerotherapy.  This is not covered by insurance and is an out-of-pocket cost.  -She is complaining of significant BLE pain, L>R  and mainly to faint spider vein medial left lower extremity described as shocking, pulsing pain  -Given significant amount of discomfort will evaluate for underlying venous insufficiency with venous reflux study  -Rx 20 to 30 mmHg thigh-high compression given.  Stockings to be worn during awake hours  -Return to the office with a surgeon to review study  -If no underlying venous insufficiency return to the office with me to discuss sclerotherapy  -Pictures on chart    Orders:    Compression Stocking    VAS Lower extremity venous insufficiency duplex, bilateral w/ measurements; Future

## 2025-02-10 NOTE — PROGRESS NOTES
"Name: Deepa Darling      : 1995      MRN: [id number]  Encounter Provider: KVNG Grewal  Encounter Date: 2/10/2025   Encounter department: [facility] [place]  :  Assessment & Plan  Varicose veins of both lower extremities with pain  29-year-old female with anxiety, bilateral lower extremity spider veins with a remote history of sclerotherapy injections in [facility].    -No significant lower extremity varicosities.  Large cluster of spider/reticular veins left lateral thigh and faint telangiectasia left medial distal lower leg.  Reticular veins popliteal fossa. No edema.  No venous stasis changes  -We discussed treatment for spider veins is injection sclerotherapy.  This is not covered by insurance and is an out-of-pocket cost.  -She is complaining of significant BLE pain, L>R  and mainly to faint spider vein medial left lower extremity described as shocking, pulsing pain  -Given significant amount of discomfort will evaluate for underlying venous insufficiency with venous reflux study  -Rx 20 to 30 mmHg thigh-high compression given.  Stockings to be worn during awake hours  -Return to the office with a surgeon to review study  -If no underlying venous insufficiency return to the office with me to discuss sclerotherapy  -Pictures on chart    Orders:    Compression Stocking    VAS Lower extremity venous insufficiency duplex, bilateral w/ measurements; Future    Asymptomatic varicose veins of unspecified lower extremity    Orders:    Ambulatory Referral to Vascular Surgery      Chief Complaint   Patient presents with    Varicose Veins     Patient has bilateral leg pain, swollen, tired and heaviness. Patient stated her legs feel hot at different times a day. Patient wears compression sometimes.          History of Present Illness   Deepa Darling is a 29 y.o. female who presents for evaluation of veins.  She is complaining of significant \"horrible\" pain in BLE.  She also " "notes occasional swelling.  She is attempting weight loss.  She works as a  overnight spending many hours on her feet.  She is wearing below-knee compression while working though at times this causes more discomfort at the posterior knee.  Previously she had injection sclerotherapy in [facility] with good resolution of veins.  Spider veins recurred with pregnancy.  Grandmother has history of venous ulcers and venous bleeding.    History obtained from: patient    Review of Systems   Cardiovascular:  Positive for leg swelling.     Medical History Reviewed by provider this encounter:  Tobacco  Allergies  Meds  Problems  Med Hx  Surg Hx  Fam Hx     .     Objective   I have reviewed and made appropriate changes to the review of systems input by the medical assistant.    Vitals:    02/10/25 1311   BP: 104/60   BP Location: Left arm   Patient Position: Sitting   Cuff Size: Adult   Pulse: 98   Temp: 98.6 °F (37 °C)   TempSrc: Temporal   SpO2: 97%   Weight: 81 kg (178 lb 9.6 oz)   Height: 5' 4\" (1.626 m)       Patient Active Problem List   Diagnosis    Annual physical exam    Varicose veins of both lower extremities with pain    Class 1 obesity due to excess calories with serious comorbidity and body mass index (BMI) of 34.0 to 34.9 in adult    Polycystic ovary    Abnormal thyroid exam    Thyromegaly    IUD (intrauterine device) in place    Generalized anxiety disorder    Plantar fasciitis, bilateral    Urticaria    Pain in joint, multiple sites       Past Surgical History:   Procedure Laterality Date    WISDOM TOOTH EXTRACTION         Family History   Problem Relation Age of Onset    Thyroid disease Mother     Hypertension Father     Hypertension Sister     Heart attack Maternal Grandfather     Thyroid cancer Paternal Grandmother     Cancer Paternal Grandfather         Cancer de prostata    Heart attack Maternal Uncle     Thyroid cancer Paternal Aunt        Social History     Socioeconomic History    " Marital status: /Civil Union     Spouse name: Not on file    Number of children: Not on file    Years of education: Not on file    Highest education level: Not on file   Occupational History    Not on file   Tobacco Use    Smoking status: Never    Smokeless tobacco: Never   Vaping Use    Vaping status: Never Used   Substance and Sexual Activity    Alcohol use: Never    Drug use: Never    Sexual activity: Yes     Partners: Male     Birth control/protection: I.U.D.   Other Topics Concern    Not on file   Social History Narrative    ** Merged History Encounter **          Social Drivers of Health     Financial Resource Strain: Not on file   Food Insecurity: Not on file   Transportation Needs: Not on file   Physical Activity: Not on file   Stress: Not on file   Social Connections: Not on file   Intimate Partner Violence: Not on file   Housing Stability: Not on file       Allergies   Allergen Reactions    Iodine - Food Allergy Hives         Current Outpatient Medications:     levonorgestrel (Liletta, 52 MG,) 20.1 mcg/day IUD, 1 each by Intrauterine route Once every 8 years, Disp: , Rfl:     metoprolol tartrate (LOPRESSOR) 50 mg tablet, Take 1 tablet (50 mg total) by mouth see administration instructions for 2 doses Take one tablet the night before and one tablet the morning of your CT., Disp: 2 tablet, Rfl: 0    tirzepatide (Zepbound) 5 mg/0.5 mL auto-injector, Inject 0.5 mL (5 mg total) under the skin once a week for 28 days, Disp: 2 mL, Rfl: 0    triamcinolone (KENALOG) 0.1 % cream, Apply topically 2 (two) times a day, Disp: 15 g, Rfl: 1    famotidine (PEPCID) 40 MG tablet, Take 1 tablet (40 mg total) by mouth daily at bedtime (Patient not taking: Reported on 11/26/2024), Disp: 90 tablet, Rfl: 1    fexofenadine (ALLEGRA ODT) 30 MG disintegrating tablet, Take 1 tablet (30 mg total) by mouth daily (Patient not taking: Reported on 11/26/2024), Disp: 90 tablet, Rfl: 1    meloxicam (MOBIC) 15 mg tablet, Take 1 tablet  "(15 mg total) by mouth daily as needed for moderate pain (Patient not taking: Reported on 11/26/2024), Disp: 90 tablet, Rfl: 1   /60 (BP Location: Left arm, Patient Position: Sitting, Cuff Size: Adult)   Pulse 98   Temp 98.6 °F (37 °C) (Temporal)   Ht 5' 4\" (1.626 m)   Wt 81 kg (178 lb 9.6 oz)   SpO2 97%   BMI 30.66 kg/m²      Physical Exam  Vitals and nursing note reviewed.   Constitutional:       Appearance: Normal appearance.   HENT:      Head: Normocephalic and atraumatic.   Eyes:      Extraocular Movements: Extraocular movements intact.   Cardiovascular:      Pulses:           Dorsalis pedis pulses are 2+ on the right side and 2+ on the left side.      Heart sounds: Normal heart sounds.   Pulmonary:      Effort: Pulmonary effort is normal.   Musculoskeletal:         General: No swelling.   Skin:     General: Skin is warm.      Comments: Large cluster spider/reticular veins left lateral thigh.  Faint telangiectasia on left medial distal lower leg   Neurological:      General: No focal deficit present.      Mental Status: She is alert and oriented to person, place, and time.   Psychiatric:         Mood and Affect: Mood normal.         Behavior: Behavior normal.           "

## 2025-02-14 ENCOUNTER — OFFICE VISIT (OUTPATIENT)
Age: 30
End: 2025-02-14
Payer: COMMERCIAL

## 2025-02-14 ENCOUNTER — HOSPITAL ENCOUNTER (OUTPATIENT)
Dept: RADIOLOGY | Facility: HOSPITAL | Age: 30
Discharge: HOME | End: 2025-02-14
Attending: CHIROPRACTOR
Payer: COMMERCIAL

## 2025-02-14 VITALS
HEIGHT: 64 IN | HEART RATE: 90 BPM | SYSTOLIC BLOOD PRESSURE: 124 MMHG | OXYGEN SATURATION: 97 % | BODY MASS INDEX: 30.05 KG/M2 | WEIGHT: 176 LBS | DIASTOLIC BLOOD PRESSURE: 70 MMHG

## 2025-02-14 DIAGNOSIS — M99.02 SEGMENTAL DYSFUNCTION OF THORACIC REGION: ICD-10-CM

## 2025-02-14 DIAGNOSIS — M79.18 MYOFASCIAL PAIN: ICD-10-CM

## 2025-02-14 DIAGNOSIS — M47.816 LUMBAR FACET JOINT SYNDROME: ICD-10-CM

## 2025-02-14 DIAGNOSIS — M79.18 MYOFASCIAL PAIN: Primary | ICD-10-CM

## 2025-02-14 DIAGNOSIS — M53.3 SACROILIAC JOINT DYSFUNCTION: ICD-10-CM

## 2025-02-14 DIAGNOSIS — M99.03 SEGMENTAL DYSFUNCTION OF LUMBAR REGION: ICD-10-CM

## 2025-02-14 PROCEDURE — 72110 X-RAY EXAM L-2 SPINE 4/>VWS: CPT

## 2025-02-14 PROCEDURE — 99203 OFFICE O/P NEW LOW 30 MIN: CPT | Performed by: CHIROPRACTOR

## 2025-02-14 NOTE — PROGRESS NOTES
1. Myofascial pain  XR spine lumbar minimum 4 views non injury      2. Segmental dysfunction of lumbar region        3. Segmental dysfunction of thoracic region        4. Sacroiliac joint dysfunction        5. Lumbar facet joint syndrome          Assessment/Plan:    Review of Diagnostic Studies and Office Notes:    The patient's December 31, 2023 x-ray report of the chest, February 10, 2025 vascular surgery office note (varicose veins of both lower extremities with pain, asymptomatic varicose veins of unspecified lower extremity), November 26, 2024 bariatric office note (polycystic ovary, thyromegaly, class I obesity due to excess calorie serious comorbidity and body mass index) were reviewed prior to the chiropractic evaluation today.    Narrative & Impression   CHEST     INDICATION:   chest pain.     COMPARISON:  None     EXAM PERFORMED/VIEWS:  XR CHEST PORTABLE        FINDINGS:     Cardiomediastinal silhouette appears unremarkable.     The lungs are clear.  No pneumothorax or pleural effusion.     Osseous structures appear within normal limits for patient age.     IMPRESSION:     No acute cardiopulmonary disease.                 Workstation performed: [id number]     The patient was referred for AP/lateral/bilateral oblique lumbar/lumbosacral views.    My clinical impression:  Mild DDD is noted at the L5-S1 level.  The remaining intervertebral disc heights are well-maintained.  Pars interarticularis are intact throughout the lumbar spine.    The x-rays of the lumbar spine were sent to the radiologist for further review.    Medical Decision Making and Treatment Plan:    The patient has limitation in lumbar flexion and extension.  Lumbar flexion is oftentimes limited by inflexibility in the bilateral hamstrings which was noted on examination today.  Limitation in lumbar extension is associated with inflexibility in the bilateral iliopsoas/hip flexors.  I will address the inflexibility in the bilateral hamstrings  and iliopsoas) to lessen the stress in the lumbar and thoracolumbar regions.  Nonetheless proper lifting and bending postures were reviewed with the patient.  The patient was advised to avoid bending at the waist. The patient has myofascial findings as well as lower thoracic facet restrictions, lumbar facet restrictions, and a sacroiliac joint restriction which can contribute to limited range of motion and pain.  The patient has a rounded shoulder posture with a forward head carriage placing abnormal stress on the cervical/upper thoracic musculature. The patient has myofascial findings as well as upper to mid thoracic facet restrictions which can contribute to limited range of motion and pain.    The patient will be treated with conservative chiropractic care including myofascial release, joint mobilization, and a home stretching and icing program.    The patient was taught lower back, gluteus medius/piriformis, and unassisted hamstring stretch today. The patient was advised to do the stretch for 3 sets of 15-20 seconds several times per day.The need to stretch to the point of tension only was discussed. ROM was measured with an [employer] digital dual inclinometer.    The patient will initially be seen 2 times per week and the frequency of treatment will be reduced to 1 time per week as there are decreased symptoms and improvement in objective findings. The patient will then be discharged.    Patient Instructions:    The patient was advised to apply ice to the region in 15-minute intervals a minimum of 3 times per day.   Proper lifting and bending postures were reviewed with the patient.    Goals:    JMLGOALS: Improve patient's ability to tolerate prolonged physical activity, Improve lumbar range of motion, and improve patient's ability to tolerate taking out the trash/garbage    TIME/Reviewed with Patient:  At least 30 minutes of time was spent with the patient during the consultation including the patient's  history/current complaints, physical exam, reviewing the diagnostic report/office notes, reviewing home instruction/reducing risk factors with the patient, reviewing my findings with the patient, and discussing the treatment/treatment plan with the patient.    Subjective:      Deepa Darling is a 29 y.o. female who presents today with pain in the bilateral medial upper thoracic region, bilateral thoracolumbar region, and bilateral lower back region (superior to the iliac crest).  She stated the pain in the medial upper thoracic region began approximately 8 months ago.  She stated she does physical work including taking out the garbage.  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  She denied any incident or trauma that caused her pain.  The patient stated the pain in the thoracolumbar region began approximately 2 months ago.  However she reported that her lower back pain began many years ago when she was pregnant with her son.  She stated that her son is now 8 years old.  She stated the bilateral lower back pain is more pronounced with being in a fully squatted position.  She also indicated that the thoracolumbar region tends to be more pronounced with doing physical activity such as taking out the garbage.  She indicated that she is in a somewhat flexed forward position when she does this activity.  The patient stated she has a history of bilateral anterior knee pain.  She also confirmed that she has a history of varicose veins in the bilateral lower legs.  Therefore she occasionally experiences throbbing sensation in the bilateral calves.  She is under the care of a vascular surgeon for the varicose veins.  The patient reported that she has some discomfort in her hands but feels it is due to opening up boxes frequently.  She denied experiencing any other pain or paresthesias in the upper extremities or lower extremities.  The patient describes her upper thoracic pain  "as a 6 on a 0-10 pain scale today.  She described the thoracolumbar pain as a 3 on a 0-10 pain scale today.  She described her lower back pain as a 3 or 4 on a 0-10 pain scale today.  On her intake paperwork she described the pain level as a 4 on a 0-10 pain scale.     Objective:    Vitals:    02/14/25 1310   BP: 124/70   Pulse: 90   SpO2: 97%   Weight: 79.8 kg (176 lb)   Height: 5' 4\" (1.626 m)     Appearance: Well developed, well nourished, and in no acute distress    Alert and oriented x 3    Mood/Affect: calm and pleasant    Gait/Posture:    Gait: Normal    Antalgic posture: None    Lordosis: Lumbar- normal    Kyphosis: Thoracic- normal    Lordosis: Cervical- decreased    Upper extremity reflexes:    Deep tendon reflexes were normal and symmetrical in the upper extremities.    Upper Extremity Muscle Testing:    Muscle testing of the bilateral upper extremities was normal and graded +5/5.    Sensory:    Sensation to light touch was normal and symmetrical in the bilateral upper extremities.    Mcleod's was negative bilaterally.    Cervical Orthopedic Tests:    Maximal Foraminal Compression was was negative  bilaterally.    Heart-Ramirez/Lindners/Brudzinsky: negative   Lower extremity reflexes:    Deep tendon reflexes were normal and symmetrical in the bilateral lower extremities.    Lower Extremity Muscle Testing:    Muscle testing of the bilateral lower extremities was normal and graded +5/5.    Sensory:    Sensation to light touch was normal and symmetrical in the bilateral lower extremities.    Babinski was negative bilaterally.    Lumbar Orthopedic Tests:    Seated Straight Leg Raise was negative  bilaterally.    Supine Straight Leg Raise was negative  on the Right.    Supine Straight Leg Raise was negative  on the Left. At least moderate inflexibility in the bilateral hamstrings.    Feliciano Test was negative  bilaterally.    Active Lumbar Ranges of Motion:    Flexion: Allowed the patient to touch the distal 1/4 " of the tibia and was restricted due to pain    Extension: 15 degrees and was restricted to pain    Right lateral flexion: 20 degrees    Left Lateral flexion: 25 degrees    Palpation:    Negative Derefield on the left. Active myofascial trigger points were present in the bilateral lower thoracic/thoracolumbar erector spinae, bilateral lumbar erector spinae, and bilateral quadratus lumborum.  Active myofascial trigger points were present in the bilateral superior trapezius, levator scapulae,,semispinalis capitis, and upper to mid thoracic portion of the spinous thoracis.  Pressure to the above listed trigger points reproduced some of the pain described in today's chief complaint.  Intersegmental motion was decreased in the thoracic spine including joint dysfunctions at T3-4, T6-7, T7-8,  T10-11, and T11-12. Intersegmental motion was decreased in the lumbar spine including joint dysfunctions at L2-3 and L5-S1. Intersegmental motion was decreased in the left sacroiliac joint.         Dictation voice to text software has been used in the creation of this document. Please consider this in light of any contextual or grammatical errors.

## 2025-02-14 NOTE — PATIENT INSTRUCTIONS
The patient was advised to apply ice to the region in 15-minute intervals a minimum of 3 times per day.   Proper lifting and bending postures were reviewed with the patient.

## 2025-02-21 ENCOUNTER — PROCEDURE VISIT (OUTPATIENT)
Age: 30
End: 2025-02-21
Payer: COMMERCIAL

## 2025-02-21 VITALS
WEIGHT: 176 LBS | DIASTOLIC BLOOD PRESSURE: 66 MMHG | HEART RATE: 97 BPM | SYSTOLIC BLOOD PRESSURE: 100 MMHG | BODY MASS INDEX: 30.05 KG/M2 | HEIGHT: 64 IN | OXYGEN SATURATION: 99 %

## 2025-02-21 DIAGNOSIS — M53.3 SACROILIAC JOINT DYSFUNCTION: ICD-10-CM

## 2025-02-21 DIAGNOSIS — M99.02 SEGMENTAL DYSFUNCTION OF THORACIC REGION: ICD-10-CM

## 2025-02-21 DIAGNOSIS — M99.03 SEGMENTAL DYSFUNCTION OF LUMBAR REGION: ICD-10-CM

## 2025-02-21 DIAGNOSIS — M47.816 LUMBAR FACET JOINT SYNDROME: ICD-10-CM

## 2025-02-21 DIAGNOSIS — M79.18 MYOFASCIAL PAIN: Primary | ICD-10-CM

## 2025-02-21 PROCEDURE — 98941 CHIROPRACT MANJ 3-4 REGIONS: CPT | Performed by: CHIROPRACTOR

## 2025-02-21 NOTE — PROGRESS NOTES
Assessment:  Today is the patient's first chiropractic treatment visit.  The patient was informed she may experience additional soreness following the today's treatment visit. The need to continue with the stretching exercises and apply ice in 15-minute intervals was discussed with the patient.    1. Myofascial pain        2. Segmental dysfunction of lumbar region        3. Segmental dysfunction of thoracic region        4. Sacroiliac joint dysfunction        5. Lumbar facet joint syndrome          Plan:  Treatment today consisted of myofascial release via active release technique to the upper thoracic portion of the bilateral spinal thoracis and upper thoracic multifidi and bilateral iliopsoas (patient performed active movements).  Myofascial release via ischemic compression was performed to the bilateral lumbar erector spinae.    Manipulation was performed to the left sacroiliac joint via Jacobs low force drop technique. Contact was made to the left ischial tuberosity and the right lateral aspect of the  apex of the sacrum. Manipulation was performed to the lumbar restrictions via manual lumbar flexion distraction technique. Manipulation was performed to the left sacroiliac joint and thoracic restrictions via grade 2 mobilization techniques and low force diversified maneuvers.  No high velocity thrust was applied.  An audible release occurred and was well-tolerated.    Subjective:  The patient reports she has been doing the prescribed lower back stretching exercises and has noticed decreased pain in her lower back.  The patient has pain in the bilateral medial upper thoracic region, bilateral thoracolumbar region, and bilateral lower back region (superior to the iliac crest). The bilateral lower back pain is more pronounced with being in a fully squatted position. The thoracolumbar region is more pronounced with doing physical activity that require being in a somewhat flexed position such as taking out the  leila. The patient describes her upper thoracic pain as a 3 or 4 on a 0-10 pain scale today.  She described the thoracolumbar/lumbar pain as a 3 or 4 on a 0-10 pain scale today.    Objective:  Negative Derefield on the left. Active myofascial trigger points were present in the bilateral lower thoracic/thoracolumbar erector spinae, bilateral lumbar erector spinae, and bilateral quadratus lumborum.  Active myofascial trigger points were present in the bilateral superior trapezius, levator scapulae,,semispinalis capitis, and upper to mid thoracic portion of the spinous thoracis. Intersegmental motion was decreased in the thoracic spine including joint dysfunctions at T3-4, T6-7, T7-8,  T10-11, and T11-12. Intersegmental motion was decreased in the lumbar spine including joint dysfunctions at L2-3 and L5-S1. Intersegmental motion was decreased in the left sacroiliac joint.       I have used the Epic copy/forward function to compose this note.  I have reviewed my current note to ensure it reflects the current patient status, exam, assessment and plan.    Dictation voice to text software has been used in the creation of this document. Please consider this in light of any contextual or grammatical errors.

## 2025-02-24 ENCOUNTER — PROCEDURE VISIT (OUTPATIENT)
Age: 30
End: 2025-02-24
Payer: COMMERCIAL

## 2025-02-24 VITALS
BODY MASS INDEX: 30.05 KG/M2 | HEART RATE: 68 BPM | OXYGEN SATURATION: 97 % | DIASTOLIC BLOOD PRESSURE: 68 MMHG | HEIGHT: 64 IN | SYSTOLIC BLOOD PRESSURE: 106 MMHG | WEIGHT: 176 LBS

## 2025-02-24 DIAGNOSIS — M53.3 SACROILIAC JOINT DYSFUNCTION: ICD-10-CM

## 2025-02-24 DIAGNOSIS — M47.816 LUMBAR FACET JOINT SYNDROME: ICD-10-CM

## 2025-02-24 DIAGNOSIS — M99.02 SEGMENTAL DYSFUNCTION OF THORACIC REGION: ICD-10-CM

## 2025-02-24 DIAGNOSIS — M79.18 MYOFASCIAL PAIN: Primary | ICD-10-CM

## 2025-02-24 DIAGNOSIS — M99.03 SEGMENTAL DYSFUNCTION OF LUMBAR REGION: ICD-10-CM

## 2025-02-24 PROCEDURE — 97110 THERAPEUTIC EXERCISES: CPT | Performed by: CHIROPRACTOR

## 2025-02-24 PROCEDURE — 98941 CHIROPRACT MANJ 3-4 REGIONS: CPT | Performed by: CHIROPRACTOR

## 2025-02-24 NOTE — PROGRESS NOTES
"Assessment:  The patient is not describing pain in the left lower back or left gluteal region but stated she felt \"weak\" and \"unstable\" in the left gluteal region.  It is unclear, due to the language barrier, if she is accustomed to being stiff in the lumbar and pelvic region as she has had longstanding symptoms in the lower back and pelvic region.  Therefore freeing up the left sacroiliac joint and affected musculature may feel strange.  Instability can possibly be a sensation that can be described by the patient.  Nonetheless no grade 2 mobilization techniques for low force diversified maneuvers were performed to the left sacroiliac joint today.  Instead focus will be made on flexibility in the involved short musculature through therapeutic procedures/exercise.    1. Myofascial pain        2. Segmental dysfunction of lumbar region        3. Segmental dysfunction of thoracic region        4. Sacroiliac joint dysfunction        5. Lumbar facet joint syndrome          Plan:  Treatment today consisted of myofascial release via ischemic compression to the left lumbar erector spinae, quadratus lumborum, left gluteus medius, and left piriformis.    Manipulation was performed to the left sacroiliac joint via Jacobs low force drop technique. Contact was made to the left ischial tuberosity and the right lateral aspect of the  apex of the sacrum. Manipulation was performed to the lumbar restrictions via manual lumbar flexion distraction technique. Manipulation was performed to the thoracic restrictions via grade 2 mobilization techniques and low force diversified maneuvers.  No high velocity thrust was applied.  An audible release occurred and was well-tolerated.    Therapeutic procedures were performed with the goal of improving flexibility and range of motion and therefore function. I performed PNF stretching to the bilateral gluteus medius and piriformis and hamstrings.  Additionally the patient performed active " "movements with passive assisted stretching to the bilateral bilateral hip flexors/iliopsoas and hamstrings. The above mentioned were performed with the goal of improving range of motion and flexibility. The therapeutic procedures/exercises were performed for at least 8 minutes.    Subjective:  The patient reports she noticed a \"weak\" sensation in the left lower back and gluteal region for approximately 2 days following her l initial chiropractic treatment visit.  She stated she noticed the weak sensation in the left lower back and gluteal region when she was walking to her vehicle.  She states she had a similar symptom in the past when she was pregnant with her son.  She stated, \"it was not pain.\"  When she searched a English  on her cell phone she stated that she may have felt \"unstable\" in the left lower back/pelvic region.  The patient stated that the symptoms subsided after the first 2 days but she thought she would mention it.  She states she had decreased symptoms in the upper to mid thoracic region. The patient has pain in the bilateral medial upper thoracic region, bilateral thoracolumbar region, and bilateral lower back region (superior to the iliac crest). The bilateral lower back pain is more pronounced with being in a fully squatted position. The thoracolumbar region is more pronounced with doing physical activity that require being in a somewhat flexed position such as taking out the garbage. The patient describes her upper thoracic pain as a 2 or 3 on a 0-10 pain scale today.  She described the thoracolumbar/lumbar pain as a 2 or 3 on a 0-10 pain scale today.    Objective:  Negative Derefield on the left. Active myofascial trigger points were present in the bilateral lower thoracic/thoracolumbar erector spinae, bilateral lumbar erector spinae, and bilateral quadratus lumborum.  Active myofascial trigger points and tenderness were also noted in the left gluteus medius and piriformis.  Active " myofascial trigger points were present in the bilateral superior trapezius, levator scapulae,,semispinalis capitis, and upper to mid thoracic portion of the spinous thoracis. Intersegmental motion was decreased in the thoracic spine including joint dysfunctions at T3-4, T6-7, T7-8, and T10-11.  Intersegmental motion was decreased in the lumbar spine including joint dysfunctions at L2-3 and L3-4. Intersegmental motion was decreased in the left sacroiliac joint.       I have used the Epic copy/forward function to compose this note.  I have reviewed my current note to ensure it reflects the current patient status, exam, assessment and plan.    Dictation voice to text software has been used in the creation of this document. Please consider this in light of any contextual or grammatical errors.

## 2025-02-26 ENCOUNTER — OFFICE VISIT (OUTPATIENT)
Dept: BARIATRICS | Facility: CLINIC | Age: 30
End: 2025-02-26
Payer: COMMERCIAL

## 2025-02-26 VITALS
SYSTOLIC BLOOD PRESSURE: 122 MMHG | DIASTOLIC BLOOD PRESSURE: 68 MMHG | HEART RATE: 78 BPM | OXYGEN SATURATION: 99 % | WEIGHT: 169.2 LBS | HEIGHT: 64 IN | BODY MASS INDEX: 28.89 KG/M2

## 2025-02-26 DIAGNOSIS — E01.0 THYROMEGALY: ICD-10-CM

## 2025-02-26 DIAGNOSIS — E66.09 CLASS 1 OBESITY DUE TO EXCESS CALORIES WITH SERIOUS COMORBIDITY AND BODY MASS INDEX (BMI) OF 34.0 TO 34.9 IN ADULT: ICD-10-CM

## 2025-02-26 DIAGNOSIS — E66.3 OVERWEIGHT WITH BODY MASS INDEX (BMI) OF 29 TO 29.9 IN ADULT: Primary | ICD-10-CM

## 2025-02-26 DIAGNOSIS — E66.811 CLASS 1 OBESITY DUE TO EXCESS CALORIES WITH SERIOUS COMORBIDITY AND BODY MASS INDEX (BMI) OF 34.0 TO 34.9 IN ADULT: ICD-10-CM

## 2025-02-26 DIAGNOSIS — E28.2 POLYCYSTIC OVARY: ICD-10-CM

## 2025-02-26 DIAGNOSIS — E55.9 VITAMIN D DEFICIENCY: ICD-10-CM

## 2025-02-26 PROCEDURE — 99214 OFFICE O/P EST MOD 30 MIN: CPT | Performed by: NURSE PRACTITIONER

## 2025-02-26 RX ORDER — TIRZEPATIDE 5 MG/.5ML
5 INJECTION, SOLUTION SUBCUTANEOUS WEEKLY
Qty: 2 ML | Refills: 2 | Status: SHIPPED | OUTPATIENT
Start: 2025-02-26 | End: 2025-05-21

## 2025-02-26 NOTE — ASSESSMENT & PLAN NOTE
- Patient is pursuing Conservative Program and follow up visits with medical weight management provider  - Initial weight loss goal of 5-10% weight loss for improved health. Weight loss can improve patient's co-morbid conditions and/or prevent weight-related complications.  - Explained the importance of continuing lifestyle changes in addition to any anti-obesity medications.   - Labs reviewed from 3/2024, 12/2024 -lab work will be ordered today to monitor since starting Zepbound    General Recommendations:  Nutrition:  Eat breakfast daily.  Do not skip meals.      Food log (ie.) www.[employer].com, sparkpeople.com, loseit.com, calorieking.com, etc.     Practice mindful eating.  Be sure to set aside time to eat, eat slowly, and savor your food.     Hydration:    At least 64oz of water daily.  No sugar sweetened beverages.  No juice (eat the fruit instead).     Exercise:  Studies have shown that the ideal exercise goal is somewhere between 150 to 300 minutes of moderate intensity exercise a week.  Start with exercising 10 minutes every other day and gradually increase physical activity with a goal of at least 150 minutes of moderate intensity exercise a week, divided over at least 3 days a week.  An example of this would be exercising 30 minutes a day, 5 days a week.  Resistance training can increase muscle mass and increase our resting metabolic rate.   FULL BODY resistance training is recommended 2-3 times a week.  Do not do this on consecutive days to allow for muscle recovery.     Aim for a bare minimum 5000 steps, even on days you do not exercise.     Monitoring:   Weigh yourself daily.  If this causes undue stress, then just weigh yourself once a week.  Weigh yourself the same time of the day with the same amount of clothing on.  Preferably this should be done after waking up, before you eat, and with no clothing or minimal clothing on.     Specific Goals:  Calorie goal:  1454-6468 armen/day   Patient lifestyle  habits were reviewed and she was congratulated on her weight loss since last visit.  Nutrition was discussed and she will continue with her balanced meal plan, protein focused foods, and portion control.  She will continue to follow with the dietitian.  medications were discussed and she is tolerating Zepbound without any side effects and is seeing successful weight loss at the 5 mg dose.  She will stay at this dose until she reaches her goal weight of 135-140 pounds.  Discussed maintenance of weight loss when patient achieves her goal weight.  She will likely stay on the medication and revisit with the dietitian to focus her calories on maintenance.  She will adjust her nutrition and increase her calorie intake and if she continues to lose weight we will adjust the medication dose.  She would like to stay on the medication as she feels like it has helped her mental health as well.  Patient will follow-up in the office every 2 to 3 months to monitor her weight loss journey.

## 2025-02-26 NOTE — PROGRESS NOTES
Assessment/Plan:     Overweight with body mass index (BMI) of 29 to 29.9 in adult  - Patient is pursuing Conservative Program and follow up visits with medical weight management provider  - Initial weight loss goal of 5-10% weight loss for improved health. Weight loss can improve patient's co-morbid conditions and/or prevent weight-related complications.  - Explained the importance of continuing lifestyle changes in addition to any anti-obesity medications.   - Labs reviewed from 3/2024, 12/2024 -lab work will be ordered today to monitor since starting Zepbound    General Recommendations:  Nutrition:  Eat breakfast daily.  Do not skip meals.      Food log (ie.) www.[email or web address], sparkpeople.com, loseit.com, calorieking.com, etc.     Practice mindful eating.  Be sure to set aside time to eat, eat slowly, and savor your food.     Hydration:    At least 64oz of water daily.  No sugar sweetened beverages.  No juice (eat the fruit instead).     Exercise:  Studies have shown that the ideal exercise goal is somewhere between 150 to 300 minutes of moderate intensity exercise a week.  Start with exercising 10 minutes every other day and gradually increase physical activity with a goal of at least 150 minutes of moderate intensity exercise a week, divided over at least 3 days a week.  An example of this would be exercising 30 minutes a day, 5 days a week.  Resistance training can increase muscle mass and increase our resting metabolic rate.   FULL BODY resistance training is recommended 2-3 times a week.  Do not do this on consecutive days to allow for muscle recovery.     Aim for a bare minimum 5000 steps, even on days you do not exercise.     Monitoring:   Weigh yourself daily.  If this causes undue stress, then just weigh yourself once a week.  Weigh yourself the same time of the day with the same amount of clothing on.  Preferably this should be done after waking up, before you eat, and with no clothing or minimal  clothing on.     Specific Goals:  Calorie goal:  2281-7121 armen/day   Patient lifestyle habits were reviewed and she was congratulated on her weight loss since last visit.  Nutrition was discussed and she will continue with her balanced meal plan, protein focused foods, and portion control.  She will continue to follow with the dietitian.  medications were discussed and she is tolerating Zepbound without any side effects and is seeing successful weight loss at the 5 mg dose.  She will stay at this dose until she reaches her goal weight of 135-140 pounds.  Discussed maintenance of weight loss when patient achieves her goal weight.  She will likely stay on the medication and revisit with the dietitian to focus her calories on maintenance.  She will adjust her nutrition and increase her calorie intake and if she continues to lose weight we will adjust the medication dose.  She would like to stay on the medication as she feels like it has helped her mental health as well.  Patient will follow-up in the office every 2 to 3 months to monitor her weight loss journey.         Deepa was seen today for follow-up.    Diagnoses and all orders for this visit:    Overweight with body mass index (BMI) of 29 to 29.9 in adult    Class 1 obesity due to excess calories with serious comorbidity and body mass index (BMI) of 34.0 to 34.9 in adult  -     Vitamin D 25 hydroxy; Future  -     Hemoglobin A1C; Future  -     TSH, 3rd generation with Free T4 reflex; Future  -     Lipid panel; Future  -     Comprehensive metabolic panel; Future  -     CBC and differential; Future  -     tirzepatide (Zepbound) 5 mg/0.5 mL auto-injector; Inject 0.5 mL (5 mg total) under the skin once a week    Thyromegaly  -     Vitamin D 25 hydroxy; Future  -     Hemoglobin A1C; Future  -     TSH, 3rd generation with Free T4 reflex; Future  -     Lipid panel; Future  -     Comprehensive metabolic panel; Future  -     CBC and differential; Future    Polycystic  ovary  -     Vitamin D 25 hydroxy; Future  -     Hemoglobin A1C; Future  -     TSH, 3rd generation with Free T4 reflex; Future  -     Lipid panel; Future  -     Comprehensive metabolic panel; Future  -     CBC and differential; Future    Vitamin D deficiency  -     Vitamin D 25 hydroxy; Future  -     Hemoglobin A1C; Future  -     TSH, 3rd generation with Free T4 reflex; Future  -     Lipid panel; Future  -     Comprehensive metabolic panel; Future  -     CBC and differential; Future        Total time spent reviewing chart, interviewing patient, examining patient, discussing plan, answering all questions, and documentin minutes with >50% face-to-face time with the patient.    Follow up in approximately 3 months with Non-Surgical Physician/Advanced Practitioner.    Subjective:   Chief Complaint   Patient presents with    Follow-up       Patient ID: Deepa Darling  is a 29 y.o. female with excess weight/obesity here to pursue weight management.  Patient is pursuing Conservative Program.   Most recent notes and records were reviewed.    HPI    Wt Readings from Last 20 Encounters:   25 76.7 kg (169 lb 3.2 oz)   25 79.8 kg (176 lb)   25 79.8 kg (176 lb)   25 79.8 kg (176 lb)   02/10/25 81 kg (178 lb 9.6 oz)   25 85.7 kg (189 lb)   24 87.1 kg (192 lb)   24 87.1 kg (192 lb)   24 87.4 kg (192 lb 9.6 oz)   24 92.3 kg (203 lb 6.4 oz)   24 92.1 kg (203 lb)   24 89.8 kg (198 lb)   24 91.6 kg (202 lb)   24 88.1 kg (194 lb 3.2 oz)   24 85 kg (187 lb 6.3 oz)   23 88 kg (194 lb)       Patient presents today to medical weight management office for follow up.   service used: [id number]  Patient is doing well on Zepbound 5mg.  She states the medication has dramatically improved her appetite and her portion control.  She is in much more control of her food choices and has met with a dietitian.  She is  incorporating protein throughout the day, avoiding skipping meals, and prepping her meals for work.  Patient states she also has significant improvement in her mental health since starting the medication which she thinks has been a huge factor in adjusting her nutrition.  She feels more focused and less anxious since starting the medication and is very happy with her overall changes.  She denies any side effects to Zepbound.      Weight loss medication and dose: Zepbound 5mg  Started weight and date: 203.4 lbs in 2024  Current weight: 169.2 lbs  Difference: -34.2 lbs  Percentage of weight loss: -16.8%  Goal weight: 60 kilos (130-135 lbs)    Starting BMI: 34.91 in 2024  Current BMI: 29.04    Waist Measurements:  2025: 39 in  2024: 33.5 in    Nutrition Prescription  Calories: 1,200-1,400 kcal  Protein: 65-82 g  Fluid: 64 ounces    Diet recall:  Night shift (grazing throughout the day)  B: eggs and jimenez and avocado OR eggs and salad OR yogurt and granola  L: pork/chicken/steak with quinoa or sweet potatoes OR cream of vegetable soup  D:  pork/chicken/steak with quinoa or sweet potatoes OR cream of vegetable soup  S: yogurt OR green egg  Take out frequency: 3-4 times per week    Hydration: water, 1 juice, 4 sodas per week  Alcohol: rare  Smoking: no  Exercise: 2 times a week weight lifting  Occupation: works in housekeeping  Sleep: not good - sleeping about 5 hours  STOP ban/8      The following portions of the patient's history were reviewed and updated as appropriate: allergies, current medications, past family history, past medical history, past social history, past surgical history, and problem list.    Family History   Problem Relation Age of Onset    Thyroid disease Mother     Hypertension Father     Hypertension Sister     Heart attack Maternal Grandfather     Thyroid cancer Paternal Grandmother     Cancer Paternal Grandfather         Cancer de prostata    Heart attack Maternal Uncle     Thyroid  "cancer Paternal Aunt         Review of Systems   Constitutional:  Negative for fatigue.   HENT:  Negative for sore throat.    Respiratory:  Negative for cough and shortness of breath.    Cardiovascular:  Negative for chest pain, palpitations and leg swelling.   Gastrointestinal:  Negative for abdominal pain, constipation, diarrhea and nausea.   Genitourinary:  Negative for dysuria.   Musculoskeletal:  Negative for arthralgias and back pain.   Skin:  Negative for rash.   Neurological:  Negative for headaches.   Psychiatric/Behavioral:  Negative for dysphoric mood. The patient is not nervous/anxious.        Objective:  /68   Pulse 78   Ht 5' 4\" (1.626 m)   Wt 76.7 kg (169 lb 3.2 oz)   LMP  (LMP Unknown)   SpO2 99%   BMI 29.04 kg/m²     Physical Exam  Vitals and nursing note reviewed.   Constitutional:       Appearance: Normal appearance. She is obese.   HENT:      Head: Normocephalic.   Pulmonary:      Effort: Pulmonary effort is normal.   Neurological:      General: No focal deficit present.      Mental Status: She is alert and oriented to person, place, and time.   Psychiatric:         Mood and Affect: Mood normal.         Behavior: Behavior normal.         Thought Content: Thought content normal.         Judgment: Judgment normal.            Labs   Most recent labs reviewed   Lab Results   Component Value Date    SODIUM 137 12/07/2024    K 4.0 12/07/2024     12/07/2024    CO2 24 12/07/2024    AGAP 10 12/07/2024    BUN 11 12/07/2024    CREATININE 0.71 12/07/2024    GLUC 81 12/31/2023    GLUF 78 12/07/2024    CALCIUM 9.4 12/07/2024    AST 18 12/07/2024    ALT 16 12/07/2024    ALKPHOS 72 12/07/2024    TP 7.5 12/07/2024    TBILI 0.50 12/07/2024    EGFR 115 12/07/2024     Lab Results   Component Value Date    HGBA1C 5.3 07/13/2024     Lab Results   Component Value Date    VHR3ZJJOTZSW 1.016 03/16/2024     Lab Results   Component Value Date    CHOLESTEROL 154 07/13/2024     Lab Results   Component Value " Date    HDL 52 07/13/2024     Lab Results   Component Value Date    TRIG 57 07/13/2024     Lab Results   Component Value Date    LDLCALC 91 07/13/2024

## 2025-02-28 ENCOUNTER — PROCEDURE VISIT (OUTPATIENT)
Age: 30
End: 2025-02-28
Payer: COMMERCIAL

## 2025-02-28 VITALS
WEIGHT: 169 LBS | OXYGEN SATURATION: 97 % | DIASTOLIC BLOOD PRESSURE: 76 MMHG | HEART RATE: 91 BPM | HEIGHT: 64 IN | SYSTOLIC BLOOD PRESSURE: 128 MMHG | BODY MASS INDEX: 28.85 KG/M2

## 2025-02-28 DIAGNOSIS — M53.3 SACROILIAC JOINT DYSFUNCTION: ICD-10-CM

## 2025-02-28 DIAGNOSIS — M99.03 SEGMENTAL DYSFUNCTION OF LUMBAR REGION: ICD-10-CM

## 2025-02-28 DIAGNOSIS — M47.816 LUMBAR FACET JOINT SYNDROME: ICD-10-CM

## 2025-02-28 DIAGNOSIS — M79.18 MYOFASCIAL PAIN: Primary | ICD-10-CM

## 2025-02-28 DIAGNOSIS — M99.02 SEGMENTAL DYSFUNCTION OF THORACIC REGION: ICD-10-CM

## 2025-02-28 PROCEDURE — 98941 CHIROPRACT MANJ 3-4 REGIONS: CPT | Performed by: CHIROPRACTOR

## 2025-02-28 NOTE — PROGRESS NOTES
"Assessment:  This is an exacerbation of a current condition.  The patient was advised to focus on improving gluteal flexibility by doing the gluteus medius/piriformis stretch frequently. The need to stretch to the point of tension only and not to the point of pain was emphasized. The patient was advised to do the stretch at 3 sets of 15 to 20 seconds several times per day.  Try to modify activities that aggravate her condition to lessen the stress on her back.    1. Myofascial pain        2. Segmental dysfunction of lumbar region        3. Segmental dysfunction of thoracic region        4. Sacroiliac joint dysfunction        5. Lumbar facet joint syndrome          Plan:  Treatment today consisted of myofascial release via ischemic compression to the bilateral lumbar erector spinae, quadratus lumborum, left gluteus medius, and left piriformis.  Myofascial release for active release techniques performed to the bilateral iliopsoas.    Manipulation was performed to the left sacroiliac joint via Jacobs low force drop technique. Contact was made to the left ischial tuberosity and the right lateral aspect of the  apex of the sacrum. Manipulation was performed to the lumbar restrictions via manual lumbar flexion distraction technique. Manipulation was performed to the thoracic restrictions via grade 2 mobilization techniques and low force diversified maneuvers.  No high velocity thrust was applied.  An audible release occurred and was well-tolerated.    PNF stretching was performed to the left gluteus medius and piriformis.    Subjective:  The patient reports she felt \"very good\" following the last chiropractic visit and over the weekend.  However she stated that she returned to work and had a very physically demanding days at work.  She stated that her pain has been more pronounced over the past 2 days.  When I advised the patient to monitor her activities with at work that aggravate her condition so they can be modified " she confirmed that taking out the garbage aggravates her condition.  She thinks she is going to start putting in more bags in each trash can so she needs does not need to replace the bag each time.  The patient states she needs to remove 80 bags of trash in each can per night.  She indicates she is still aware of the discomfort in the bilateral lower back and gluteal region.  The patient reported that she had similar symptoms in the past and had gone swimming for approximately 6 months and it helped.  She states she is aware of more symptoms in the upper to mid thoracic region when she has more pronounced symptoms in the lower back and left gluteal region. The bilateral lower back pain is more pronounced with being in a fully squatted position. The thoracolumbar region is more pronounced with doing physical activity that require being in a somewhat flexed position such as taking out the garbage. The patient describes her upper thoracic pain as a 4 on a 0-10 pain scale today.  She described the thoracolumbar/lumbar pain as a 4 on a 0-10 pain scale today.    Objective:  Negative Derefield on the left. Active myofascial trigger points were present in the bilateral lower thoracic/thoracolumbar erector spinae, bilateral lumbar erector spinae, and bilateral quadratus lumborum.  Active myofascial trigger points and tenderness were also noted in the left gluteus medius and piriformis.  Active myofascial trigger points were present in the bilateral superior trapezius, levator scapulae,,semispinalis capitis, and upper to mid thoracic portion of the spinous thoracis. Intersegmental motion was decreased in the thoracic spine including joint dysfunctions at T4-5, T7-8, and T10-11.  Intersegmental motion was decreased in the lumbar spine including joint dysfunctions at L2-3 and L5-S1. Intersegmental motion was decreased in the left sacroiliac joint.       I have used the Epic copy/forward function to compose this note.  I have  reviewed my current note to ensure it reflects the current patient status, exam, assessment and plan.    Dictation voice to text software has been used in the creation of this document. Please consider this in light of any contextual or grammatical errors.

## 2025-02-28 NOTE — PATIENT INSTRUCTIONS
The patient was advised to continue with the icing and stretching instructions.   The patient was advised to focus on improving gluteal flexibility by doing the gluteus medius/piriformis stretch frequently. The need to stretch to the point of tension only and not to the point of pain was emphasized. The patient was advised to do the stretch at 3 sets of 15 to 20 seconds several times per day.  Try to modify activities that aggravate her condition to lessen the stress on her back.

## 2025-03-07 ENCOUNTER — PROCEDURE VISIT (OUTPATIENT)
Age: 30
End: 2025-03-07
Payer: COMMERCIAL

## 2025-03-07 VITALS
SYSTOLIC BLOOD PRESSURE: 116 MMHG | HEIGHT: 64 IN | BODY MASS INDEX: 28.85 KG/M2 | HEART RATE: 98 BPM | WEIGHT: 169 LBS | OXYGEN SATURATION: 99 % | DIASTOLIC BLOOD PRESSURE: 68 MMHG

## 2025-03-07 DIAGNOSIS — M79.18 MYOFASCIAL PAIN: Primary | ICD-10-CM

## 2025-03-07 DIAGNOSIS — M99.02 SEGMENTAL DYSFUNCTION OF THORACIC REGION: ICD-10-CM

## 2025-03-07 DIAGNOSIS — M47.816 LUMBAR FACET JOINT SYNDROME: ICD-10-CM

## 2025-03-07 DIAGNOSIS — M53.3 SACROILIAC JOINT DYSFUNCTION: ICD-10-CM

## 2025-03-07 DIAGNOSIS — M99.03 SEGMENTAL DYSFUNCTION OF LUMBAR REGION: ICD-10-CM

## 2025-03-07 PROCEDURE — 98941 CHIROPRACT MANJ 3-4 REGIONS: CPT | Performed by: CHIROPRACTOR

## 2025-03-07 NOTE — PROGRESS NOTES
Assessment:  The patient is progressing better since her latest exacerbation.    1. Myofascial pain        2. Segmental dysfunction of lumbar region        3. Segmental dysfunction of thoracic region        4. Sacroiliac joint dysfunction        5. Lumbar facet joint syndrome          Plan:  Treatment today consisted of myofascial release via ischemic compression to the bilateral lumbar erector spinae and quadratus lumborum.  Myofascial release for active release techniques performed to the upper thoracic portion of the bilateral spinal thoracis and upper thoracic multifidi (patient performed active movements).    Manipulation was performed to the left sacroiliac joint via Jacobs low force drop technique. Contact was made to the left ischial tuberosity and the right lateral aspect of the  apex of the sacrum. Manipulation was performed to the lumbar restrictions via manual lumbar flexion distraction technique. Manipulation was performed to the thoracic restrictions via grade 2 mobilization techniques and low force diversified maneuvers.  No high velocity thrust was applied.  An audible release occurred and was well-tolerated.    Subjective:  The patient reports she felt much better when compared to the last chiropractic visit.  She stated she has made modifications at work including placing several garbage bags in the garbage containers so that she does not need to refill them as often.  She did not experiencing symptoms in the left gluteal region any longer.  She indicated that her symptoms are localized to the lower back region and are much less intense.  The patient has discomfort in the upper to mid thoracic region.  The bilateral lower back pain is more pronounced with being in a fully squatted position. The thoracolumbar region is more pronounced with doing physical activity that require being in a somewhat flexed position such as taking out the garbage. The patient describes her upper thoracic pain as a 1 on  a 0-10 pain scale today.  She described the thoracolumbar/lumbar pain as a 1 or 2 on a 0-10 pain scale today.    Objective:  Negative Derefield on the left. Active myofascial trigger points were present in the bilateral lower thoracic/thoracolumbar erector spinae, bilateral lumbar erector spinae, and bilateral quadratus lumborum. Active myofascial trigger points were present in the bilateral superior trapezius, levator scapulae, and upper to mid thoracic portion of the spinous thoracis.  Hypertonicity and tenderness are less noted in the upper thoracic portion of the bilateral spinal thoracis.  Intersegmental motion was decreased in the thoracic spine including joint dysfunctions at T4-5, T7-8, and T10-11.  Intersegmental motion was decreased in the lumbar spine including joint dysfunctions at L2-3 and L3-4. Intersegmental motion was decreased in the left sacroiliac joint.       I have used the Epic copy/forward function to compose this note.  I have reviewed my current note to ensure it reflects the current patient status, exam, assessment and plan.    Dictation voice to text software has been used in the creation of this document. Please consider this in light of any contextual or grammatical errors.

## 2025-03-14 ENCOUNTER — PROCEDURE VISIT (OUTPATIENT)
Age: 30
End: 2025-03-14
Payer: COMMERCIAL

## 2025-03-14 VITALS
DIASTOLIC BLOOD PRESSURE: 70 MMHG | OXYGEN SATURATION: 98 % | SYSTOLIC BLOOD PRESSURE: 112 MMHG | BODY MASS INDEX: 28.85 KG/M2 | HEART RATE: 90 BPM | WEIGHT: 169 LBS | HEIGHT: 64 IN

## 2025-03-14 DIAGNOSIS — M53.3 SACROILIAC JOINT DYSFUNCTION: ICD-10-CM

## 2025-03-14 DIAGNOSIS — M47.816 LUMBAR FACET JOINT SYNDROME: ICD-10-CM

## 2025-03-14 DIAGNOSIS — M79.18 MYOFASCIAL PAIN: Primary | ICD-10-CM

## 2025-03-14 DIAGNOSIS — M99.02 SEGMENTAL DYSFUNCTION OF THORACIC REGION: ICD-10-CM

## 2025-03-14 DIAGNOSIS — M99.03 SEGMENTAL DYSFUNCTION OF LUMBAR REGION: ICD-10-CM

## 2025-03-14 PROCEDURE — 98941 CHIROPRACT MANJ 3-4 REGIONS: CPT | Performed by: CHIROPRACTOR

## 2025-03-14 PROCEDURE — 97110 THERAPEUTIC EXERCISES: CPT | Performed by: CHIROPRACTOR

## 2025-03-14 NOTE — PROGRESS NOTES
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  As indicated on her initial chiropractic evaluation she needs to have flexibility in the hamstring musculature in order to perform repetitive bending movements without placing strain on her lower back.  Therefore I began to focus on improving hamstring and gluteal flexibility in order to obtain better lumbar range of motion.    1. Myofascial pain        2. Segmental dysfunction of lumbar region        3. Segmental dysfunction of thoracic region        4. Sacroiliac joint dysfunction        5. Lumbar facet joint syndrome          Plan:  Treatment today consisted of myofascial release via ischemic compression to the left lumbar erector spinae, quadratus lumborum, and left gluteus medius.  Myofascial release for active release techniques performed to the upper thoracic portion of the bilateral spinal thoracis and upper thoracic multifidi (patient performed active movements).    Manipulation was performed to the left sacroiliac joint and left L5/S1 facet restriction via Jacobs low force drop technique. Contact was made to the left ischial tuberosity and the right lateral aspect of the apex of the sacrum. Manipulation was performed to the lumbar restrictions via manual lumbar flexion distraction technique. Manipulation was performed to the thoracic restrictions via grade 2 mobilization techniques and low force diversified maneuvers.  No high velocity thrust was applied.  An audible release occurred and was well-tolerated.    Therapeutic procedures were performed with the goal of improving flexibility and range of motion and therefore function. I performed PNF stretching to the bilateral gluteus medius and piriformis and hamstrings.  Additionally the patient performed active movements with passive assisted stretching to the bilateral iliopsoas/hip flexors. The above mentioned were performed with the goal of improving range of motion and  flexibility. The therapeutic procedures/exercises were performed for at least 8-10 minutes.    Subjective:  The patient reports she has some more discomfort in her lower back and medial upper thoracic region when compared to the last chiropractic visit.  She states she has a physical job and towards the end of the week she is aware of more pain in the medial upper thoracic and lower back regions.  The patient states she was unable to attend her chiropractic visit on Monday because she had a migraine headache.  She confirmed that she continues to maintain modifications at work including placing several garbage bags in the garbage containers so that she does not need to refill them as often.  She indicated that her symptoms are localized to the lower back region.  The patient has discomfort in the upper to mid thoracic region.  The bilateral lower back pain is more pronounced with being in a fully squatted position. The thoracolumbar region is more pronounced with doing physical activity that require being in a somewhat flexed position such as taking out the garbage. The patient describes her upper thoracic pain as a 2 on a 0-10 pain scale today.  She described the thoracolumbar/lumbar pain as a 3 on a 0-10 pain scale today.    Objective:  Negative Derefield on the left. Active myofascial trigger points were present in the bilateral lower thoracic/thoracolumbar erector spinae, bilateral lumbar erector spinae, and bilateral quadratus lumborum. Active myofascial trigger points were present in the bilateral superior trapezius, levator scapulae, and upper to mid thoracic portion of the spinous thoracis. Intersegmental motion was decreased in the thoracic spine including joint dysfunctions at T3-4, T4-5, T7-8, and T10-11.  Intersegmental motion was decreased in the lumbar spine including joint dysfunctions at L2-3 and L5-S1. Intersegmental motion was decreased in the left sacroiliac joint.  Bilateral hamstring and gluteal  flexibility were moderately limited.       I have used the Epic copy/forward function to compose this note.  I have reviewed my current note to ensure it reflects the current patient status, exam, assessment and plan.    Dictation voice to text software has been used in the creation of this document. Please consider this in light of any contextual or grammatical errors.

## 2025-03-17 ENCOUNTER — PROCEDURE VISIT (OUTPATIENT)
Age: 30
End: 2025-03-17
Payer: COMMERCIAL

## 2025-03-17 VITALS — BODY MASS INDEX: 28.85 KG/M2 | WEIGHT: 169 LBS | HEIGHT: 64 IN

## 2025-03-17 DIAGNOSIS — M53.3 SACROILIAC JOINT DYSFUNCTION: ICD-10-CM

## 2025-03-17 DIAGNOSIS — M47.816 LUMBAR FACET JOINT SYNDROME: ICD-10-CM

## 2025-03-17 DIAGNOSIS — M79.18 MYOFASCIAL PAIN: Primary | ICD-10-CM

## 2025-03-17 DIAGNOSIS — M99.03 SEGMENTAL DYSFUNCTION OF LUMBAR REGION: ICD-10-CM

## 2025-03-17 DIAGNOSIS — M99.02 SEGMENTAL DYSFUNCTION OF THORACIC REGION: ICD-10-CM

## 2025-03-17 PROCEDURE — 98941 CHIROPRACT MANJ 3-4 REGIONS: CPT | Performed by: CHIROPRACTOR

## 2025-03-17 NOTE — PROGRESS NOTES
Assessment:  This is an exacerbation of a current condition.  The patient is indicated having localized pain to the left lumbar/sacroiliac region.  Therefore Graston Technique was performed to the left affected musculature to determine if she has more relief with this form of myofascial release.    1. Myofascial pain        2. Segmental dysfunction of lumbar region        3. Segmental dysfunction of thoracic region        4. Sacroiliac joint dysfunction        5. Lumbar facet joint syndrome          Plan:  Treatment today consisted of myofascial release via Graston Technique to the left lumbar erector spinae, quadratus lumborum, and left gluteus medius (superficial).  GT 3 was used.    Manipulation was performed to the left sacroiliac joint and left L5/S1 facet restriction via Jacobs low force drop technique. Contact was made to the left ischial tuberosity and the right lateral aspect of the apex of the sacrum. Manipulation was performed to the lumbar restrictions via manual lumbar flexion distraction technique. Manipulation was performed to the thoracic restrictions via grade 2 mobilization techniques and low force diversified maneuvers.  No high velocity thrust was applied.  An audible release occurred and was well-tolerated.    Subjective:  The patient reports she had more localized pain in the left lower back/sacroiliac region over the weekend.  She stated it subsided today but is still present.  The patient stated she has been applying ice in 15 intervals as instructed and still had pain over the weekend.  She stated the pain in the medial upper thoracic is much better. The bilateral lower back pain is more pronounced with being in a fully squatted position. The thoracolumbar region is more pronounced with doing physical activity that require being in a somewhat flexed position such as taking out the garbage. She described her lumbar pain as a 2 on a 0-10 pain scale today.    Objective:  Negative Derefield on  the left. Active myofascial trigger points were present in the bilateral lower thoracic/thoracolumbar erector spinae, bilateral lumbar erector spinae, and bilateral quadratus lumborum. Active myofascial trigger points were present in the bilateral superior trapezius, levator scapulae, and upper to mid thoracic portion of the spinous thoracis. Intersegmental motion was decreased in the thoracic spine including joint dysfunctions at T3-4, T6-7, and T10-11.  Intersegmental motion was decreased in the lumbar spine including joint dysfunctions at L4-5 and L5-S1. Intersegmental motion was decreased in the left sacroiliac joint.       I have used the Epic copy/forward function to compose this note.  I have reviewed my current note to ensure it reflects the current patient status, exam, assessment and plan.    Dictation voice to text software has been used in the creation of this document. Please consider this in light of any contextual or grammatical errors.

## 2025-03-21 ENCOUNTER — PROCEDURE VISIT (OUTPATIENT)
Age: 30
End: 2025-03-21
Payer: COMMERCIAL

## 2025-03-21 VITALS — HEIGHT: 64 IN | WEIGHT: 169 LBS | BODY MASS INDEX: 28.85 KG/M2

## 2025-03-21 DIAGNOSIS — M79.18 MYOFASCIAL PAIN: Primary | ICD-10-CM

## 2025-03-21 DIAGNOSIS — M53.3 SACROILIAC JOINT DYSFUNCTION: ICD-10-CM

## 2025-03-21 DIAGNOSIS — M47.816 LUMBAR FACET JOINT SYNDROME: ICD-10-CM

## 2025-03-21 DIAGNOSIS — M99.02 SEGMENTAL DYSFUNCTION OF THORACIC REGION: ICD-10-CM

## 2025-03-21 DIAGNOSIS — M99.03 SEGMENTAL DYSFUNCTION OF LUMBAR REGION: ICD-10-CM

## 2025-03-21 PROCEDURE — 98941 CHIROPRACT MANJ 3-4 REGIONS: CPT | Performed by: CHIROPRACTOR

## 2025-03-21 NOTE — PROGRESS NOTES
Assessment:  This is an exacerbation of a current condition.  The gluteus medius/piriformis stretching exercises were reviewed with the patient again today.The need to stretch to the point of tension only and not to the point of pain was emphasized. The patient was advised to do the stretch at 3 sets of 15 to 20 seconds several times per day.  The patient was provided a sheet with the gluteus medius/piriformis stretching instructions.    1. Myofascial pain        2. Segmental dysfunction of lumbar region        3. Segmental dysfunction of thoracic region        4. Sacroiliac joint dysfunction        5. Lumbar facet joint syndrome          Plan:  Treatment today consisted of myofascial release via ischemic compression to the left lumbar erector spinae, quadratus lumborum, left gluteus medius, and left piriformis.    Manipulation was performed to the left sacroiliac joint via Jacobs low force drop technique. Contact was made to the left ischial tuberosity and the right lateral aspect of the apex of the sacrum. Manipulation was performed to the lumbar restrictions via manual lumbar flexion distraction technique. Manipulation was performed to the thoracic restrictions via grade 2 mobilization techniques and low force diversified maneuvers.  No high velocity thrust was applied.  An audible release occurred and was well-tolerated.    PNF stretching was performed to the left gluteus medius and piriformis.    Discussion:  Following treatment today the patient reported feeling much better.    Subjective:  The patient reports she has increased pain in the left sacroiliac and superior to mid gluteal region today.  The patient stated she feels as though her job is to physical.  She stated she feels a pain level of a 3 on a 0-10 pain scale before she goes to work.  However she reported she feels a pain level of an 8 on a 0-10 pain scale when she has completed a workday.  The patient stated she did more work to cover for  "another employee who was unable to go to work on Wednesday.  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  She stated she took off the past 2 days because she is experiencing more pain in the left gluteal region.  She denied experiencing pain or paresthesias in the left lower extremity but stated she is experiencing \"sciatic\" pain in the left sacroiliac and left medial gluteal region.  Once again she stated the pain in the medial upper thoracic is much better. The bilateral lower back pain is more pronounced with prolonged physical work. The thoracolumbar region is more pronounced with doing physical activity that require being in a somewhat flexed position such as taking out the garbage. She described her lumbar pain as an 8  on a 0-10 pain scale at its worst.  When the patient was asked to demonstrate the stretching exercises that she has been doing she only demonstrated the single knee-to-chest and double knee-to-chest stretching exercises.  She has not been doing the gluteus medius and piriformis stretching exercises that were prescribed.    Objective:  Negative Derefield on the left. Active myofascial trigger points were present in the left lumbar erector spinae, left quadratus lumborum, left gluteus medius, and left piriformis.  Pressure upon the trigger points in the left gluteus medius and piriformis reproduced the pain described in today's chief complaint.  Active myofascial trigger points were present in the bilateral superior trapezius, levator scapulae, and upper to mid thoracic portion of the spinous thoracis. Intersegmental motion was decreased in the thoracic spine including joint dysfunctions at T3-4, T4-5, T7-8, and T10-11.  Intersegmental motion was decreased in the lumbar spine including joint dysfunctions at L4-5 and L5-S1. Intersegmental motion was decreased in the left sacroiliac joint.       I have " used the Epic copy/forward function to compose this note.  I have reviewed my current note to ensure it reflects the current patient status, exam, assessment and plan.    Dictation voice to text software has been used in the creation of this document. Please consider this in light of any contextual or grammatical errors.

## 2025-03-21 NOTE — PATIENT INSTRUCTIONS
The patient was advised to continue with the icing and stretching instructions.   The patient was advised to focus on improving gluteal flexibility by doing the gluteus medius/piriformis stretch frequently. The need to stretch to the point of tension only and not to the point of pain was emphasized. The patient was advised to do the stretch at 3 sets of 15 to 20 seconds several times per day.

## 2025-03-24 ENCOUNTER — PROCEDURE VISIT (OUTPATIENT)
Age: 30
End: 2025-03-24
Payer: COMMERCIAL

## 2025-03-24 VITALS — BODY MASS INDEX: 28.85 KG/M2 | WEIGHT: 169 LBS | HEIGHT: 64 IN

## 2025-03-24 DIAGNOSIS — M99.03 SEGMENTAL DYSFUNCTION OF LUMBAR REGION: ICD-10-CM

## 2025-03-24 DIAGNOSIS — M79.18 MYOFASCIAL PAIN: Primary | ICD-10-CM

## 2025-03-24 DIAGNOSIS — M47.816 LUMBAR FACET JOINT SYNDROME: ICD-10-CM

## 2025-03-24 DIAGNOSIS — M99.02 SEGMENTAL DYSFUNCTION OF THORACIC REGION: ICD-10-CM

## 2025-03-24 DIAGNOSIS — M53.3 SACROILIAC JOINT DYSFUNCTION: ICD-10-CM

## 2025-03-24 PROCEDURE — 98941 CHIROPRACT MANJ 3-4 REGIONS: CPT | Performed by: CHIROPRACTOR

## 2025-03-25 NOTE — PROGRESS NOTES
Assessment:  The patient is progressing as expected since her latest exacerbation.  Due to the report of relief of symptoms I will continue with the same chiropractic treatment plan.    1. Myofascial pain        2. Segmental dysfunction of lumbar region        3. Segmental dysfunction of thoracic region        4. Sacroiliac joint dysfunction        5. Lumbar facet joint syndrome          Plan:  Treatment today consisted of myofascial release via ischemic compression to the left lumbar erector spinae, quadratus lumborum, left gluteus medius, and left piriformis.    Manipulation was performed to the left sacroiliac joint via Jacobs low force drop technique. Contact was made to the left ischial tuberosity and the right lateral aspect of the apex of the sacrum. Manipulation was performed to the lumbar restrictions via manual lumbar flexion distraction technique. Manipulation was performed to the thoracic restrictions via grade 2 mobilization techniques and low force diversified maneuvers.  No high velocity thrust was applied.  An audible release occurred and was well-tolerated.    PNF stretching was performed to the left gluteus medius and piriformis.    Subjective:  The patient reported feeling much better in the left sacroiliac and superior to mid gluteal region when compared to last chiropractic visit.  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  The patient has less pronounced pain in the medial upper thoracic region.  The bilateral lower back pain is more pronounced with prolonged physical work. The thoracolumbar region is more pronounced with doing physical activity that require being in a somewhat flexed position such as taking out the garbage. She described her lumbar pain as a 3 on a 0-10 pain scale at its worst.    Objective:  Negative Derefield on the left. Active myofascial trigger points were present in the left lumbar erector spinae, left  quadratus lumborum, left gluteus medius, and left piriformis.  Decreased trigger point activity, hypertonicity, and tenderness is noted in the left lumbar and gluteal musculature when compared to last chiropractic visit.  Active myofascial trigger points were present in the bilateral superior trapezius, levator scapulae, and upper to mid thoracic portion of the spinous thoracis. Intersegmental motion was decreased in the thoracic spine including joint dysfunctions at T4-5, T7-8, and T10-11.  Intersegmental motion was decreased in the lumbar spine including joint dysfunctions at L2-3 and L5-S1. Intersegmental motion was decreased in the left sacroiliac joint.       I have used the Epic copy/forward function to compose this note.  I have reviewed my current note to ensure it reflects the current patient status, exam, assessment and plan.    Dictation voice to text software has been used in the creation of this document. Please consider this in light of any contextual or grammatical errors.

## 2025-03-28 ENCOUNTER — PROCEDURE VISIT (OUTPATIENT)
Age: 30
End: 2025-03-28
Payer: COMMERCIAL

## 2025-03-28 VITALS — WEIGHT: 169 LBS | BODY MASS INDEX: 28.85 KG/M2 | HEIGHT: 64 IN

## 2025-03-28 DIAGNOSIS — M47.816 LUMBAR FACET JOINT SYNDROME: ICD-10-CM

## 2025-03-28 DIAGNOSIS — M53.3 SACROILIAC JOINT DYSFUNCTION: ICD-10-CM

## 2025-03-28 DIAGNOSIS — M99.02 SEGMENTAL DYSFUNCTION OF THORACIC REGION: ICD-10-CM

## 2025-03-28 DIAGNOSIS — M79.18 MYOFASCIAL PAIN: Primary | ICD-10-CM

## 2025-03-28 DIAGNOSIS — M99.03 SEGMENTAL DYSFUNCTION OF LUMBAR REGION: ICD-10-CM

## 2025-03-28 PROCEDURE — 98941 CHIROPRACT MANJ 3-4 REGIONS: CPT | Performed by: CHIROPRACTOR

## 2025-03-28 PROCEDURE — 99213 OFFICE O/P EST LOW 20 MIN: CPT | Performed by: CHIROPRACTOR

## 2025-03-28 NOTE — PROGRESS NOTES
1. Myofascial pain        2. Segmental dysfunction of lumbar region        3. Segmental dysfunction of thoracic region        4. Sacroiliac joint dysfunction        5. Lumbar facet joint syndrome            Assessment/Plan:    Review of office note/indexes:    The patient's February 14, 2025 initial chiropractic evaluation office note, neck index, and back index were reviewed today to determine progress since the chiropractic initial evaluation.    Medical Decision Making and Treatment Plan:    The patient describes her lumbar/pelvic pain as 75% improved overall.  The patient describes the medial upper thoracic/midline upper thoracic pain as 75% improved overall.    Objectively, the patient has a significant increase in lumbar extension with increased bilateral lumbar lateral flexion maneuvers but a mild decrease in lumbar flexion when compared to her initial chiropractic evaluation. her back index decreased from 28 to 16 indicating positive functional improvement since her initial chiropractic evaluation.    Due to the improvement in objective findings and function and report of relief of symptoms I will continue with chiropractic care.    The patient will be treated with conservative chiropractic care including myofascial release, joint mobilization, and a home stretching and icing program.    The patient will be seen 2 times per week for 4 weeks and then reassess for progress.    Patient Instructions:    The patient was advised to continue with the icing and stretching instructions.     Goals:    JMLGOALS: Improve patient's ability to tolerate prolonged physical activity and improve patient's ability to tolerate frequent bending/lifting (proper lifting/bending postures)    During the consultation the following was included: The patient's history/current complaints, physical exam, reviewing the initial chiropractic evaluation office note and back index to determine progress, reviewing home  instruction/reducing risk factors with the patient, reviewing my findings/progress with the patient, and discussing the treatment/treatment plan with the patient.    This is a separate identifiable portion of today's visit from the E and M code billed.    Treatment today consisted of myofascial release via ischemic compression to the left lumbar erector spinae, quadratus lumborum, left gluteus medius, and left piriformis.    Manipulation was performed to the left sacroiliac joint via Jacobs low force drop technique. Contact was made to the left ischial tuberosity and the right lateral aspect of the apex of the sacrum. Manipulation was performed to the lumbar restrictions via manual lumbar flexion distraction technique. Manipulation was performed to the thoracic restrictions via grade 2 mobilization techniques and low force diversified maneuvers.  No high velocity thrust was applied.  An audible release occurred and was well-tolerated.    PNF stretching was performed to the left gluteus medius and piriformis..    Subjective:      Deepa Darling is a 30 y.o. female who reported feeling better when compared to the last chiropractic visit.  Overall she described the lumbar/pelvic pain as 75% improved.  The patient stated she is aware of more pronounced pain in the left lower back/sacroiliac region and left medial superior to mid/distal gluteal region with more physical work than average and working longer hours than average.  The patient stated that if she is allowed to take her time and does not do more than is required on a typical shift she has only mild pain.  She states she has made changes at her work to lessen the stress on her lower back/pelvic region.  She feels this is helping as well.  The patient states she has decreased pain in the left lower back/sacroiliac region and left medial superior to mid/distal gluteal region with doing the prescribed stretching exercises.  She denied experiencing pain  "or paresthesias in the lower extremities.  She describes her pain level as a 1 or 2 on a 0-10 pain scale today.  The patient also reported feeling decreased pain in the midline/medial upper thoracic region.  Overall she described the midline/medial upper thoracic pain as 75% improved.  Once again she stated this region is more pronounced with more physical work that average.  She stated she has decree symptoms with doing the prescribed pectoralis/subscapularis stretch as she feels that it has a positive effect on her posture.       Objective:    Vitals:    03/28/25 1551   Weight: 76.7 kg (169 lb)   Height: 5' 4\" (1.626 m)     Appearance: Well developed, well nourished, and in no acute distress    Alert and oriented x 3    Mood/Affect: calm and pleasant    Gait/Posture:    Gait: Normal    Antalgic posture: None    Lordosis: Cervical- decreased    Lordosis: Lumbar- normal    Kyphosis: Thoracic- normal    Upper extremity reflexes:    Deep tendon reflexes were normal and symmetrical in the upper extremities.    Upper Extremity Muscle Testing:    Muscle testing of the bilateral upper extremities was normal and graded +5/5.    Sensory:    Sensation to light touch was normal and symmetrical in the bilateral upper extremities.    Mcleod's was negative bilaterally.    Cervical Orthopedic Tests:    Maximal Foraminal Compression was was negative  bilaterally.    Lower extremity reflexes:    Deep tendon reflexes were normal and symmetrical in the bilateral lower extremities.    Lower Extremity Muscle Testing:    Muscle testing of the bilateral lower extremities was normal and graded +5/5.    Sensory:    Sensation to light touch was noted in the bilateral lower extremities.    Babinski was negative bilaterally.    Lumbar Orthopedic Tests:    Seated Straight Leg Raise was negative  on the Right.    Seated Straight Leg Raise was positive on the Left for mild left gluteal discomfort.    Bragard's Test was positive on left for " increased left gluteal discomfort.    Feliciano Test was negative  bilaterally.    Active Lumbar Ranges of Motion:    Flexion: Allowed the patient to touch the distal 1/3 of the tibia    Extension: 30 degrees    Right lateral flexion: 32 degrees    Left Lateral flexion: 30 degrees    Palpation:    Negative Derefield on the left. Active myofascial trigger points were present in the left lumbar erector spinae, left quadratus lumborum, left gluteus medius, and left piriformis.  Trigger point activity, hypertonicity, and tenderness are decreasing in the left lumbar and gluteal musculature when compared to earlier chiropractic visits.  Active myofascial trigger points were present in the bilateral superior trapezius, levator scapulae, and upper to mid thoracic portion of the spinous thoracis. Intersegmental motion was decreased in the thoracic spine including joint dysfunctions at T4-5, T7-8, and T10-11.  Intersegmental motion was decreased in the lumbar spine including joint dysfunctions at L2-3 and L5-S1. Intersegmental motion was decreased in the left sacroiliac joint.         I have used the Epic copy/forward function to compose this note. I have reviewed my current note to ensure it reflects the current patient status, exam, assessment and plan.    Dictation voice to text software has been used in the creation of this document. Please consider this in light of any contextual or grammatical errors.

## 2025-04-01 DIAGNOSIS — E66.811 CLASS 1 OBESITY DUE TO EXCESS CALORIES WITH SERIOUS COMORBIDITY AND BODY MASS INDEX (BMI) OF 34.0 TO 34.9 IN ADULT: ICD-10-CM

## 2025-04-01 DIAGNOSIS — E66.09 CLASS 1 OBESITY DUE TO EXCESS CALORIES WITH SERIOUS COMORBIDITY AND BODY MASS INDEX (BMI) OF 34.0 TO 34.9 IN ADULT: ICD-10-CM

## 2025-04-01 RX ORDER — TIRZEPATIDE 5 MG/.5ML
5 INJECTION, SOLUTION SUBCUTANEOUS WEEKLY
Qty: 2 ML | Refills: 0 | OUTPATIENT
Start: 2025-04-01 | End: 2025-06-24

## 2025-04-04 ENCOUNTER — PROCEDURE VISIT (OUTPATIENT)
Age: 30
End: 2025-04-04
Payer: COMMERCIAL

## 2025-04-04 VITALS — BODY MASS INDEX: 28.85 KG/M2 | HEIGHT: 64 IN | WEIGHT: 169 LBS

## 2025-04-04 DIAGNOSIS — M99.02 SEGMENTAL DYSFUNCTION OF THORACIC REGION: ICD-10-CM

## 2025-04-04 DIAGNOSIS — M99.03 SEGMENTAL DYSFUNCTION OF LUMBAR REGION: ICD-10-CM

## 2025-04-04 DIAGNOSIS — M79.18 MYOFASCIAL PAIN: Primary | ICD-10-CM

## 2025-04-04 DIAGNOSIS — M47.816 LUMBAR FACET JOINT SYNDROME: ICD-10-CM

## 2025-04-04 DIAGNOSIS — M53.3 SACROILIAC JOINT DYSFUNCTION: ICD-10-CM

## 2025-04-04 PROCEDURE — 98941 CHIROPRACT MANJ 3-4 REGIONS: CPT | Performed by: CHIROPRACTOR

## 2025-04-04 NOTE — PATIENT INSTRUCTIONS
The patient was advised to continue with the icing and stretching instructions.   Continue to use the lumbar support when doing physical work only.

## 2025-04-04 NOTE — PROGRESS NOTES
Assessment:  The patient indicated having improvement and demonstrated improvement in function and objective findings on the last chiropractic visit (reevaluation).  However the patient had a larger gap between chiropractic visits and is continuing to do very physical work.  I feel it is beneficial for her to use a lumbar support when she is doing physical work only.  However proper use of the lumbar support was reviewed with the patient.  The patient is indicating having severe pain in the lumbar and pelvic region associated with physical work.  Therefore if she is not making adequate progress despite consistent conservative chiropractic care (2 times per week) along with using the lumbar support during physical activities the patient will be referred for an MRI of the lumbar spine.  The left S1 nerve root would be in question.    1. Myofascial pain        2. Segmental dysfunction of lumbar region        3. Segmental dysfunction of thoracic region        4. Sacroiliac joint dysfunction        5. Lumbar facet joint syndrome          Plan:  Treatment today consisted of myofascial release via ischemic compression to the bilateral lumbar erector spinae, quadratus lumborum, bilateral gluteus medius, and bilateral piriformis.    Manipulation was performed to the left sacroiliac joint via Jacobs low force drop technique. Contact was made to the left ischial tuberosity and the right lateral aspect of the apex of the sacrum. Manipulation was performed to the lumbar restrictions via manual lumbar flexion distraction technique. Manipulation was performed to the thoracic restrictions via grade 2 mobilization techniques and low force diversified maneuvers.  No high velocity thrust was applied.    PNF stretching was performed to the bilateral gluteus medius and piriformis..    Subjective:  The patient reported that she had increased pain 2 days ago while at work.  She stated that she began to use a lumbar support yesterday and  that provided her some relief.  Therefore she does not feel as much pain today.  The patient level of an 8 on a 0-10 pain scale that she described to my assistant today was in reference to her pain when she had the exacerbation 2 days ago.  The patient indicated that she had pain in the bilateral lower back/sacroiliac region and left medial superior to mid/distal gluteal region.  However she also indicated having pain into the right gluteal region and left groin region.  The patient stated that the pain has subsided and is localized to where she typically experiences the pain (left sacroiliac and left medial gluteal region).  She states she has a history of limitation in groin mobility when she was an infant.  She reported that she had physical therapy to improve the mobility of her hips and did not know whether this was contributing to her left groin symptoms.  She typically has more pronounced pain with more physical work than average and working longer hours than average.  She has decreased pain in the left lower back/sacroiliac region and left medial superior to mid/distal gluteal region with doing the prescribed stretching exercises. The patient reported feeling the same in the midline/medial upper thoracic region.  The patient has decreased symptoms with doing the prescribed pectoralis/subscapularis stretch.     Objective:  Negative Derefield on the left. Active myofascial trigger points were present in the left lumbar erector spinae, left quadratus lumborum, left gluteus medius, and left piriformis.  Active myofascial trigger points were also present in the right lumbar erector spinae, right gluteus medius, and right piriformis but less pronounced when compared to the left.  Although tenderness was more pronounced in the right lumbar erector spinae when compared to the left.  Active myofascial trigger points were present in the bilateral superior trapezius, levator scapulae, and upper to mid thoracic portion  of the spinous thoracis. Intersegmental motion was decreased in the thoracic spine including joint dysfunctions at T4-5, T7-8, and T10-11.  Intersegmental motion was decreased in the lumbar spine including joint dysfunctions at L2-3 and L5-S1. Intersegmental motion was decreased in the left sacroiliac joint.         I have used the Epic copy/forward function to compose this note. I have reviewed my current note to ensure it reflects the current patient status, exam, assessment and plan.    Dictation voice to text software has been used in the creation of this document. Please consider this in light of any contextual or grammatical errors.   Applied

## 2025-04-07 ENCOUNTER — PROCEDURE VISIT (OUTPATIENT)
Age: 30
End: 2025-04-07
Payer: COMMERCIAL

## 2025-04-07 VITALS — HEIGHT: 64 IN | BODY MASS INDEX: 28.85 KG/M2 | WEIGHT: 169 LBS

## 2025-04-07 DIAGNOSIS — M99.02 SEGMENTAL DYSFUNCTION OF THORACIC REGION: ICD-10-CM

## 2025-04-07 DIAGNOSIS — M47.816 LUMBAR FACET JOINT SYNDROME: ICD-10-CM

## 2025-04-07 DIAGNOSIS — M53.3 SACROILIAC JOINT DYSFUNCTION: ICD-10-CM

## 2025-04-07 DIAGNOSIS — M79.18 MYOFASCIAL PAIN: ICD-10-CM

## 2025-04-07 DIAGNOSIS — M99.03 SEGMENTAL DYSFUNCTION OF LUMBAR REGION: ICD-10-CM

## 2025-04-07 DIAGNOSIS — M54.16 LEFT LUMBAR RADICULITIS: Primary | ICD-10-CM

## 2025-04-07 PROCEDURE — 98941 CHIROPRACT MANJ 3-4 REGIONS: CPT | Performed by: CHIROPRACTOR

## 2025-04-07 NOTE — PROGRESS NOTES
Assessment:  The patient has made changes to make her work less physically stressful including using a lumbar support and decreasing the frequency of bending.  However the pain is persistent and worse with prolonged physical activity.  Due to the persistent pain in the left lower back, medial to mid gluteal region, and distal gluteal region despite 7 weeks of conservative chiropractic care the patient was referred for an MRI of the lumbar spine without contrast.  The left S1 nerve root is in question.    1. Myofascial pain        2. Segmental dysfunction of lumbar region        3. Segmental dysfunction of thoracic region        4. Sacroiliac joint dysfunction        5. Lumbar facet joint syndrome          Plan:  Treatment today consisted of myofascial release via ischemic compression to the left lumbar erector spinae, left quadratus lumborum, left gluteus medius, and left piriformis.    Manipulation was performed to the left sacroiliac joint via Jacbos low force drop technique. Contact was made to the left ischial tuberosity and the right lateral aspect of the apex of the sacrum. Manipulation was performed to the lumbar restrictions via manual lumbar flexion distraction technique. Manipulation was performed to the thoracic restrictions via grade 2 mobilization techniques and low force diversified maneuvers.  No high velocity thrust was applied.    PNF stretching was performed to the left gluteus medius and piriformis.    Discussion:  The patient reported feeling much better following chiropractic care today.  She stated her pain was a 2 or less on a 0-10 pain scale following chiropractic treatment today.    Subjective:  The patient reported that she she feels much better following chiropractic treatment visits.  However she reported that she returns to work and her pain worsens.  Once again she stated that she has made modifications to lessen the stress on her back.  She also stated that another employee  oftentimes helps her with the trash.  Nonetheless she stated that she continues to experience pain in the left lower back left medial mid gluteal region and also in the left distal gluteal region.  She also indicated having some pain in the left distal posterior lateral hip region.  The patient reported that the last time that she had symptoms similar to this she rested for approximately 20 days. She stated it was during her pregnancy and she had similar symptoms at that time to which she is currently experiencing.  The patient reported that she will be going away on vacation in 18 days.  She typically has more pronounced pain with more physical work than average and working longer hours than average.  She has decreased pain in the left lower back/sacroiliac region and left medial superior to mid/distal gluteal region with doing the prescribed stretching exercises. The patient reported feeling the same in the midline/medial upper thoracic region.  The patient has decreased midline/medial upper thoracic region symptoms with doing the prescribed pectoralis/subscapularis stretch.  The patient describes her pain level as a 5 on a 0-10 pain scale today.    Objective:  Negative Derefield on the left. Active myofascial trigger points were present in the left lumbar erector spinae, left quadratus lumborum, left gluteus medius, and left piriformis. Active myofascial trigger points were present in the bilateral superior trapezius, levator scapulae, and upper to mid thoracic portion of the spinous thoracis. Intersegmental motion was decreased in the thoracic spine including joint dysfunctions at T4-5, T7-8, and T10-11.  Intersegmental motion was decreased in the lumbar spine including joint dysfunctions at L2-3 and L5-S1. Intersegmental motion was decreased in the left sacroiliac joint.         I have used the Epic copy/forward function to compose this note. I have reviewed my current note to ensure it reflects the current  patient status, exam, assessment and plan.    Dictation voice to text software has been used in the creation of this document. Please consider this in light of any contextual or grammatical errors.

## 2025-04-09 PROBLEM — E66.09 CLASS 1 OBESITY DUE TO EXCESS CALORIES WITH SERIOUS COMORBIDITY AND BODY MASS INDEX (BMI) OF 34.0 TO 34.9 IN ADULT: Status: RESOLVED | Noted: 2023-06-16 | Resolved: 2025-04-09

## 2025-04-09 PROBLEM — E66.811 CLASS 1 OBESITY DUE TO EXCESS CALORIES WITH SERIOUS COMORBIDITY AND BODY MASS INDEX (BMI) OF 34.0 TO 34.9 IN ADULT: Status: RESOLVED | Noted: 2023-06-16 | Resolved: 2025-04-09

## 2025-04-09 PROBLEM — R94.31 ABNORMAL ECG DURING EXERCISE STRESS TEST: Status: ACTIVE | Noted: 2025-04-09

## 2025-04-09 NOTE — ASSESSMENT & PLAN NOTE
Had equivocal result needs to follow up with  cardiology  Orders:  •  Ambulatory Referral to Cardiology; Future

## 2025-04-09 NOTE — PROGRESS NOTES
"Name: Deepa Darling      : 1995      MRN: [id number]  Encounter Provider: Bernadine Earl MD  Encounter Date: 2025   Encounter department: [facility]  :  Assessment & Plan  Abnormal ECG during exercise stress test  Had equivocal result needs to follow up with  cardiology  Orders:  •  Ambulatory Referral to Cardiology; Future    Left lumbar radiculitis  Pt seen by chiropractor mRI ordered        Overweight with body mass index (BMI) of 29 to 29.9 in adult  On glp-1 followed by bariatric        Pain in joint, multiple sites    Orders:  •  meloxicam (MOBIC) 15 mg tablet; Take 1 tablet (15 mg total) by mouth daily as needed for moderate pain    Acute bilateral low back pain with left-sided sciatica  Pt needs note to rtw 25 out this week and next week start  PT refill meloxicam and robaxin    Orders:  •  Ambulatory Referral to Physical Therapy; Future  •  methocarbamol (ROBAXIN) 500 mg tablet; Take 1 tablet (500 mg total) by mouth daily at bedtime           History of Present Illness   Pt with complaints of low back pain has been under chiropractic care  also needs to follow up with cardiology had equivocal exercise stress test     Review of Systems   Musculoskeletal:  Positive for back pain (severe low back pain).       Objective   /70 (BP Location: Left arm, Patient Position: Sitting, Cuff Size: Standard)   Pulse 98   Temp 97.8 °F (36.6 °C) (Tympanic)   Resp 16   Ht 5' 4\" (1.626 m)   Wt 74.8 kg (165 lb)   SpO2 97%   BMI 28.32 kg/m²      Physical Exam  Musculoskeletal:         General: Tenderness (bilateral lumbar paraspinal muscles left sciatic notch pain) present.         "

## 2025-04-10 ENCOUNTER — PROCEDURE VISIT (OUTPATIENT)
Age: 30
End: 2025-04-10
Payer: COMMERCIAL

## 2025-04-10 VITALS — WEIGHT: 169 LBS | HEIGHT: 64 IN | BODY MASS INDEX: 28.85 KG/M2

## 2025-04-10 DIAGNOSIS — M47.816 LUMBAR FACET JOINT SYNDROME: ICD-10-CM

## 2025-04-10 DIAGNOSIS — M53.3 SACROILIAC JOINT DYSFUNCTION: ICD-10-CM

## 2025-04-10 DIAGNOSIS — M99.03 SEGMENTAL DYSFUNCTION OF LUMBAR REGION: ICD-10-CM

## 2025-04-10 DIAGNOSIS — M54.16 LEFT LUMBAR RADICULITIS: Primary | ICD-10-CM

## 2025-04-10 DIAGNOSIS — M99.02 SEGMENTAL DYSFUNCTION OF THORACIC REGION: ICD-10-CM

## 2025-04-10 DIAGNOSIS — M79.18 MYOFASCIAL PAIN: ICD-10-CM

## 2025-04-10 PROCEDURE — 98941 CHIROPRACT MANJ 3-4 REGIONS: CPT | Performed by: CHIROPRACTOR

## 2025-04-10 NOTE — PROGRESS NOTES
Assessment:  The patient is progressing better since her latest exacerbation.    1. Myofascial pain        2. Segmental dysfunction of lumbar region        3. Segmental dysfunction of thoracic region        4. Sacroiliac joint dysfunction        5. Lumbar facet joint syndrome          Plan:  Treatment today consisted of myofascial release via ischemic compression to the left lumbar erector spinae, left quadratus lumborum, left gluteus medius, and left piriformis.    Manipulation was performed to the left sacroiliac joint via Jacobs low force drop technique. Contact was made to the left ischial tuberosity and the right lateral aspect of the apex of the sacrum. Manipulation was performed to the lumbar restrictions via manual lumbar flexion distraction technique. Manipulation was performed to the thoracic restrictions via grade 2 mobilization techniques and low force diversified maneuvers.  No high velocity thrust was applied.  An audible release occurred and was well-tolerated.    PNF stretching was performed to the left gluteus medius and piriformis.    Subjective:  The patient reported that she she feels better when compared to the last chiropractic visit.  She states she did not work the past 2 days and therefore has not been stressing the lower back region.  The patient has pain in the left lower back left medial mid gluteal region. She typically has more pronounced pain with more physical work than average and working longer hours than average.  She has decreased pain in the left lower back/sacroiliac region and left medial superior to mid/distal gluteal region with doing the prescribed stretching exercises. The patient reported feeling the same in the midline/medial upper thoracic region.  The patient has decreased midline/medial upper thoracic region symptoms with doing the prescribed pectoralis/subscapularis stretch.  The patient describes her pain level as a 3 on a 0-10 pain scale  today.    Objective:  Negative Derefield on the left. Active myofascial trigger points were present in the left lumbar erector spinae, left quadratus lumborum, left gluteus medius, and left piriformis.  Trigger point activity and hypertonicity were less noted in the left lumbar erector spinae and quadratus lumborum when compared to last chiropractic visit.  Active myofascial trigger points were present in the bilateral superior trapezius, levator scapulae, and upper to mid thoracic portion of the spinous thoracis. Intersegmental motion was decreased in the thoracic spine including joint dysfunctions at T4-5, T8-9, and T11-12.  Intersegmental motion was decreased in the lumbar spine including joint dysfunctions at L2-3 and L3-4. Intersegmental motion was decreased in the left sacroiliac joint.         I have used the Epic copy/forward function to compose this note. I have reviewed my current note to ensure it reflects the current patient status, exam, assessment and plan.    Dictation voice to text software has been used in the creation of this document. Please consider this in light of any contextual or grammatical errors.

## 2025-04-11 ENCOUNTER — OFFICE VISIT (OUTPATIENT)
Dept: FAMILY MEDICINE CLINIC | Facility: CLINIC | Age: 30
End: 2025-04-11
Payer: COMMERCIAL

## 2025-04-11 VITALS
RESPIRATION RATE: 16 BRPM | WEIGHT: 165 LBS | TEMPERATURE: 97.8 F | HEART RATE: 98 BPM | OXYGEN SATURATION: 97 % | BODY MASS INDEX: 28.17 KG/M2 | SYSTOLIC BLOOD PRESSURE: 120 MMHG | DIASTOLIC BLOOD PRESSURE: 70 MMHG | HEIGHT: 64 IN

## 2025-04-11 DIAGNOSIS — M54.16 LEFT LUMBAR RADICULITIS: ICD-10-CM

## 2025-04-11 DIAGNOSIS — M54.42 ACUTE BILATERAL LOW BACK PAIN WITH LEFT-SIDED SCIATICA: ICD-10-CM

## 2025-04-11 DIAGNOSIS — M25.50 PAIN IN JOINT, MULTIPLE SITES: ICD-10-CM

## 2025-04-11 DIAGNOSIS — R94.31 ABNORMAL ECG DURING EXERCISE STRESS TEST: Primary | ICD-10-CM

## 2025-04-11 DIAGNOSIS — E66.3 OVERWEIGHT WITH BODY MASS INDEX (BMI) OF 29 TO 29.9 IN ADULT: ICD-10-CM

## 2025-04-11 PROCEDURE — 99213 OFFICE O/P EST LOW 20 MIN: CPT | Performed by: FAMILY MEDICINE

## 2025-04-11 RX ORDER — MELOXICAM 15 MG/1
15 TABLET ORAL DAILY PRN
Qty: 90 TABLET | Refills: 1 | Status: SHIPPED | OUTPATIENT
Start: 2025-04-11

## 2025-04-11 RX ORDER — METHOCARBAMOL 500 MG/1
500 TABLET, FILM COATED ORAL
Qty: 30 TABLET | Refills: 0 | Status: SHIPPED | OUTPATIENT
Start: 2025-04-11

## 2025-04-11 NOTE — ASSESSMENT & PLAN NOTE
Pt needs note to rtw 4/21/25 out this week and next week start  PT refill meloxicam and robaxin    Orders:  •  Ambulatory Referral to Physical Therapy; Future  •  methocarbamol (ROBAXIN) 500 mg tablet; Take 1 tablet (500 mg total) by mouth daily at bedtime

## 2025-04-16 ENCOUNTER — PROCEDURE VISIT (OUTPATIENT)
Age: 30
End: 2025-04-16
Payer: COMMERCIAL

## 2025-04-16 VITALS — WEIGHT: 165 LBS | HEIGHT: 64 IN | BODY MASS INDEX: 28.17 KG/M2

## 2025-04-16 DIAGNOSIS — M99.03 SEGMENTAL DYSFUNCTION OF LUMBAR REGION: ICD-10-CM

## 2025-04-16 DIAGNOSIS — M99.02 SEGMENTAL DYSFUNCTION OF THORACIC REGION: ICD-10-CM

## 2025-04-16 DIAGNOSIS — M54.16 LEFT LUMBAR RADICULITIS: Primary | ICD-10-CM

## 2025-04-16 DIAGNOSIS — M47.816 LUMBAR FACET JOINT SYNDROME: ICD-10-CM

## 2025-04-16 DIAGNOSIS — M53.3 SACROILIAC JOINT DYSFUNCTION: ICD-10-CM

## 2025-04-16 DIAGNOSIS — M79.18 MYOFASCIAL PAIN: ICD-10-CM

## 2025-04-16 PROCEDURE — 98941 CHIROPRACT MANJ 3-4 REGIONS: CPT | Performed by: CHIROPRACTOR

## 2025-04-16 PROCEDURE — 97110 THERAPEUTIC EXERCISES: CPT | Performed by: CHIROPRACTOR

## 2025-04-16 NOTE — PROGRESS NOTES
Assessment:  Bernadine Earl MD April 11, 2025 family medicine office note was reviewed prior to chiropractic treatment today.  Dr. Earl referred the patient for physical therapy.  The patient has a physical therapy evaluation scheduled for Friday.  Based on the frequency of the physical therapy (not determined at this time) will depend on whether the patient has chiropractic care 1 time per week or if she will only do physical therapy as the patient needs to heal between visits.    1. Myofascial pain        2. Segmental dysfunction of lumbar region        3. Segmental dysfunction of thoracic region        4. Sacroiliac joint dysfunction        5. Lumbar facet joint syndrome          Plan:  Treatment today consisted of myofascial release via ischemic compression to the left lumbar erector spinae, left quadratus lumborum, left gluteus medius, and left piriformis.    Manipulation was performed to the left sacroiliac joint via Jacobs low force drop technique. Contact was made to the left ischial tuberosity and the right lateral aspect of the apex of the sacrum. Manipulation was performed to the lumbar restrictions via manual lumbar flexion distraction technique. Manipulation was performed to the thoracic restrictions via grade 2 mobilization techniques and low force diversified maneuvers.  No high velocity thrust was applied.  An audible release occurred and was well-tolerated.    Therapeutic procedures were performed with the goal of improving flexibility and range of motion and therefore function. I performed PNF stretching to the bilateral gluteus medius and piriformis.  Additionally the patient performed active movements with passive assisted stretching to the lumbar erector spinae, gluteal musculature, superior trapezius, and levator scapula. The above mentioned were performed with the goal of improving range of motion and flexibility. The therapeutic procedures/exercises were performed for at least 10  minutes.      Subjective:  The patient reported that she she feels better when compared to the last chiropractic visit.  She reported that she has been excused from work for 10 days.  Therefore she has less pain.  The patient confirmed that she was referred to physical therapy.  She states she begins physical therapy on Friday. The patient has pain in the left lower back left medial mid gluteal region. She typically has more pronounced pain with more physical work than average and working longer hours than average.  She has decreased pain in the left lower back/sacroiliac region and left medial superior to mid/distal gluteal region with doing the prescribed stretching exercises. The patient reported feeling the same in the midline/medial upper thoracic region.  The patient has decreased midline/medial upper thoracic region symptoms with doing the prescribed pectoralis/subscapularis stretch.  The patient describes her pain level as a 2 on a 0-10 pain scale today.    Objective:  Negative Derefield on the left. Active myofascial trigger points were present in the left lumbar erector spinae, left quadratus lumborum, left gluteus medius, and left piriformis.  Trigger point activity and hypertonicity were less noted in the left lumbar erector spinae and quadratus lumborum when compared to last chiropractic visit.  However moderate tenderness is still noted in the left lower lumbar erector spinae and pressure to the these trigger points reproduced some of the pain described in today's chief complaint.  Active myofascial trigger points were present in the bilateral superior trapezius, levator scapulae, and upper to mid thoracic portion of the spinous thoracis. Intersegmental motion was decreased in the thoracic spine including joint dysfunctions at T4-5, T8-9, and T11-12.  Intersegmental motion was decreased in the lumbar spine including joint dysfunctions at L2-3 and L5-S1. Intersegmental motion was decreased in the left  sacroiliac joint.         I have used the Epic copy/forward function to compose this note. I have reviewed my current note to ensure it reflects the current patient status, exam, assessment and plan.    Dictation voice to text software has been used in the creation of this document. Please consider this in light of any contextual or grammatical errors.

## 2025-04-18 ENCOUNTER — EVALUATION (OUTPATIENT)
Dept: PHYSICAL THERAPY | Facility: CLINIC | Age: 30
End: 2025-04-18
Attending: FAMILY MEDICINE
Payer: COMMERCIAL

## 2025-04-18 DIAGNOSIS — M54.42 ACUTE BILATERAL LOW BACK PAIN WITH LEFT-SIDED SCIATICA: ICD-10-CM

## 2025-04-18 PROCEDURE — 97110 THERAPEUTIC EXERCISES: CPT

## 2025-04-18 PROCEDURE — 97112 NEUROMUSCULAR REEDUCATION: CPT

## 2025-04-18 PROCEDURE — 97161 PT EVAL LOW COMPLEX 20 MIN: CPT

## 2025-04-18 NOTE — PROGRESS NOTES
PT Evaluation     Today's date: 2025  Patient name: Deepa Darling  : 1995  MRN: [id number]  Referring provider: Bernadine Earl MD  Dx:   Encounter Diagnosis     ICD-10-CM    1. Acute bilateral low back pain with left-sided sciatica  M54.42 Ambulatory Referral to Physical Therapy          Start Time: 1430  Stop Time: 1515  Total time in clinic (min): 45 minutes    Assessment  Impairments: abnormal or restricted ROM, activity intolerance, impaired physical strength, lacks appropriate home exercise program, pain with function, participation limitations, activity limitations and endurance    Assessment details: Problem List:  1) Lumbar spine hypomobility  2) LLE strength impairment    Deepa Darling is a pleasant 30 y.o. female who presents with L>R low back pain with L radiculopathy. She has lumbar spine hypomobility and LLE strength impairment resulting in pain and difficulty with activities that involve forward bending, lifting, standing for prolonged periods of time, and walking. Repeated motions examination demonstrates lumbar derangement syndrome with directional preference into extension, as pain levels overall decreased and pain with resisted knee flexion/extension decreased. No further referral appears necessary at this time based upon examination results.  I expect she will improve with lumbar extension exercise progression.        Comparable signs:  1) Standing for >20 minutes  2) Resisted knee flexion and extension  3) Walking long distances    Goals  Short term (1-3 weeks)  1. Pt will perform prescribed HEP independently.  2. Pt will be able to manage symptoms independently.  Long term (12 weeks)  1. Pt will report ability to stand and walk for >20 minutes.  2. Pt will report ability to perform full work duties without increase in low back and hip pain.  3. Pt will demo ability to lift 30# weight from the floor without increase in low back and hip pain.    Plan  Patient  would benefit from: skilled physical therapy and home program  Planned modality interventions: biofeedback, thermotherapy: hydrocollator packs, cryotherapy and unattended electrical stimulation    Planned therapy interventions: abdominal trunk stabilization, joint mobilization, manual therapy, neuromuscular re-education, strengthening, flexibility, stretching, therapeutic activities, functional ROM exercises, therapeutic exercise and home exercise program    Frequency: 1-2x week  Duration in weeks: 12  Plan of Care beginning date: 2025  Plan of Care expiration date: 2025  Treatment plan discussed with: patient        Subjective Evaluation    History of Present Illness  Mechanism of injury: Patient is a 30 y.o. Female who reports to OP PT for evaluation and treatment of low back pain with L sciatica. She has been having pain in her L low back for many years, which began to spread to the L posterior/lateral hip 1-2 months ago. She fell on her bottom many years ago, but felt better after 1 week. Occasionally, she will feel pain on her R low back.  She had similar symptoms 8 years ago when she was pregnant. She felt unstable in her pelvis for a year after she gave birth.  Pain is aggravated with walking, lifting heavy trash bags, stairs, prolonged sitting.   Easing factors include: laying prone on yoga ball.  She has been receiving chiropractic treatment; feels better immediately after appointments but the pain returns 3-4 hours later.  She works as a  for [employer]  Goals: Tennis, soccer, work    Denies history of cancer, unexpected weight change, night pain, fever/sweats/chills, bowel/bladder changes, saddle paresthesia.    Pain  Current pain rating: 3  At best pain ratin  At worst pain ratin          Objective      Reflexes R L   L2-L4 (patellar) 2+ 2+   S1 (ankle jerk 2+ 2+   Clonus (-) (-)   Babinski (-) (-)     Dermatomes R L   L1 WNL WNL   L2 WNL hypo   L3 WNL hypo   L4 WNL WNL   L5  WNL WNL   S1 WNL WNL   S2 WNL WNL     Myotomes R L   L2 WNL WNL   L3 WNL WNL   L4 WNL WNL   L5 WNL WNL   S1 WNL WNL   S2 WNL WNL      MMT R L   Hip flexion 5 5   Hip extension     Hip abduction     Hip adduction     Hip ER     Hip IR     Knee flexion 5 4+*   Knee extension 5 5*   Ankle DF 5 5   Ankle PF 5 5   Ankle inversion     Ankle eversion       AROM R L   Hip flexion WNL WNL   Hip extension     Hip abduction     Hip adduction     Hip ER WNL WNL   Hip IR WNL WNL   Knee flexion WNL WNL   Knee extension WNL WNL   Ankle DF WNL WNL   Ankle PF WNL WNL   Ankle inversion     Ankle eversion       AROM R L   Lumbar flexion WNL   Lumbar extension Limited 25%   Lumbar side glide WNL Limited 25%     Slump: (+) R, (-) L    Repeated movements examination Reponse   REIL No effect, better             Precautions: none      Manuals 4/18                                                                Neuro Re-Ed                                                                                           Edu Re: postural re-education, lumbar neuroanatomy            Ther Ex             REIL 3x10    X10 lock and sag                                                                                          Edu Re: POC, HEP            Ther Activity                                       Gait Training                                       Modalities

## 2025-04-22 ENCOUNTER — OFFICE VISIT (OUTPATIENT)
Dept: PHYSICAL THERAPY | Facility: CLINIC | Age: 30
End: 2025-04-22
Attending: FAMILY MEDICINE
Payer: COMMERCIAL

## 2025-04-22 DIAGNOSIS — M54.42 ACUTE BILATERAL LOW BACK PAIN WITH LEFT-SIDED SCIATICA: Primary | ICD-10-CM

## 2025-04-22 PROCEDURE — 97112 NEUROMUSCULAR REEDUCATION: CPT

## 2025-04-22 NOTE — PROGRESS NOTES
Daily Note     Today's date: 2025  Patient name: Deepa Darling  : 1995  MRN: [id number]  Referring provider: Berandine Earl MD  Dx:   Encounter Diagnosis     ICD-10-CM    1. Acute bilateral low back pain with left-sided sciatica  M54.42           Start Time: 1500  Stop Time: 1532  Total time in clinic (min): 32 minutes    Subjective: Pt reports that her L low back and posterior hip are at 5/10 pain right now; she went back to work yesterday for the first time and pain flared again. Reports that she has not performed REIL at home.      Objective: See treatment diary below      Assessment: Pt reported decrease in low back and posterior hip pain to 1-2/10 following double knees to chest, indicating directional preference of flexion today. We also discussed that because this is an acute flare of pain, there is likely an inflammatory aspect to the pain; we discussed taking the Meloxicam she was prescribed to manage this. We also discussed ways to modify vocational duties to prevent excessive lumbar flexion and extension. All treatments tolerated well, no adverse response.      Plan: Continue per plan of care.      Precautions: none      Manuals                                                      Neuro Re-Ed           Hip adductor isometric  Hooklying w/ yoga block [id number]                                                                Edu Re: postural re-education, lumbar neuroanatomy postural re-education         Ther Ex           REIL 3x10    X10 lock and sag X10    X10 lock and sag         DKTC  2x15 (better)         Repeated lumbar flexion  Sitting  x15  (better)                                                     Edu Re: POC, HEP          Ther Activity                                 Gait Training                                 Modalities

## 2025-04-25 ENCOUNTER — PROCEDURE VISIT (OUTPATIENT)
Age: 30
End: 2025-04-25
Payer: COMMERCIAL

## 2025-04-25 VITALS — HEIGHT: 64 IN | WEIGHT: 165 LBS | BODY MASS INDEX: 28.17 KG/M2

## 2025-04-25 DIAGNOSIS — M79.18 MYOFASCIAL PAIN: ICD-10-CM

## 2025-04-25 DIAGNOSIS — M54.16 LEFT LUMBAR RADICULITIS: Primary | ICD-10-CM

## 2025-04-25 DIAGNOSIS — M47.816 LUMBAR FACET JOINT SYNDROME: ICD-10-CM

## 2025-04-25 DIAGNOSIS — M99.02 SEGMENTAL DYSFUNCTION OF THORACIC REGION: ICD-10-CM

## 2025-04-25 DIAGNOSIS — M53.3 SACROILIAC JOINT DYSFUNCTION: ICD-10-CM

## 2025-04-25 DIAGNOSIS — M99.03 SEGMENTAL DYSFUNCTION OF LUMBAR REGION: ICD-10-CM

## 2025-04-25 PROCEDURE — 98941 CHIROPRACT MANJ 3-4 REGIONS: CPT | Performed by: CHIROPRACTOR

## 2025-04-25 NOTE — PROGRESS NOTES
"Assessment:  The patient is scheduled for an MRI of the lumbar spine on May 3, 2025.  Other treatment options or a change in the chiropractic treatment plan may be suggested based on the results of the MRI report of the lumbar spine.    1. Myofascial pain        2. Segmental dysfunction of lumbar region        3. Segmental dysfunction of thoracic region        4. Sacroiliac joint dysfunction        5. Lumbar facet joint syndrome          Plan:  Treatment today consisted of myofascial release via ischemic compression to the left lumbar erector spinae, left quadratus lumborum, left gluteus medius, and left piriformis.    Manipulation was performed to the left sacroiliac joint via Jacobs low force drop technique. Contact was made to the left ischial tuberosity and the right lateral aspect of the apex of the sacrum. Manipulation was performed to the lumbar restrictions via manual lumbar flexion distraction technique. Manipulation was performed to the thoracic restrictions via grade 2 mobilization techniques and low force diversified maneuvers.  No high velocity thrust was applied.  An audible release occurred and was well-tolerated.    PNF stretching was performed to the left gluteus medius and piriformis.    Subjective:  The patient reported that she she continues to experience pain in the left sacroiliac/superior and mid gluteal region and describes this region as \"burning\" at times.  She informed my assistant that the pain \"comes and goes.\"  She stated that the more pronounced pain occurs when she is working.  She stated she has been working for 20 minutes but then takes a 5-minute break throughout her work shift.  She also indicated having intermittent central lumbosacral/midline sacral pain.  The patient confirmed that she began physical therapy.  She states she is scheduled for the MRI of the lumbar spine on May 3, 2025.  She reported that she will be on vacation for 1 week beginning tomorrow.  She typically has " more pronounced pain with more physical work than average and working longer hours than average.  She has decreased pain in the left lower back/sacroiliac region and left medial superior to mid/distal gluteal region with doing the prescribed stretching exercises. The patient reported feeling the same in the midline/medial upper thoracic region.  The patient has decreased midline/medial upper thoracic region symptoms with doing the prescribed pectoralis/subscapularis stretch.  The patient describes her pain level as a 4 on a 0-10 pain scale when at work.    Objective:  Negative Derefield on the left. Active myofascial trigger points were present in the left lumbar erector spinae, left quadratus lumborum, left gluteus medius, and left piriformis.  Once again trigger point activity and hypertonicity were less noted in the left lumbar erector spinae and quadratus lumborum when compared to last chiropractic visit.  Decreased tenderness is noted in the left lower lumbar erector spinae when compared to last chiropractic visit.  Active myofascial trigger points were present in the bilateral superior trapezius, levator scapulae, and upper to mid thoracic portion of the spinous thoracis. Intersegmental motion was decreased in the thoracic spine including joint dysfunctions at T4-5, T7-8, and T10-11.  Intersegmental motion was decreased in the lumbar spine including joint dysfunctions at L1-2 and L5-S1. Intersegmental motion was decreased in the left sacroiliac joint.         I have used the Epic copy/forward function to compose this note. I have reviewed my current note to ensure it reflects the current patient status, exam, assessment and plan.    Dictation voice to text software has been used in the creation of this document. Please consider this in light of any contextual or grammatical errors.

## 2025-04-25 NOTE — PROGRESS NOTES
Name: Deepa Darling      : 1995      MRN: [id number]  Encounter Provider: Bernadine Earl MD  Encounter Date: 2025   Encounter department: [facility]  :  Assessment & Plan  Annual physical exam  Check routine labs         Overweight with body mass index (BMI) of 29 to 29.9 in adult  On zepbound followed by bariatrics       Acute bilateral low back pain with left-sided sciatica  Pt started PT initially helps but returns to work and has to lift so pain returned pt has MRI next week pain described as deep burning pain will try gabapentin      Orders:  •  gabapentin (Neurontin) 100 mg capsule; Take 1 capsule (100 mg total) by mouth 3 (three) times a day    Thyromegaly  7 mm intrathyroid lymph node       Encounter for weight management  Pt doing well on zepbound        Eczema, unspecified type    Orders:  •  triamcinolone (KENALOG) 0.5 % cream; Apply topically 2 (two) times a day           History of Present Illness   Patient here for annual physical        Review of Systems   Constitutional:  Negative for appetite change, chills, fatigue and fever.   Respiratory:  Negative for cough, chest tightness and shortness of breath.    Cardiovascular:  Negative for chest pain, palpitations and leg swelling.   Gastrointestinal:  Negative for abdominal pain, constipation, diarrhea, nausea and vomiting.   Genitourinary:  Negative for difficulty urinating and frequency.   Musculoskeletal:  Negative for arthralgias, back pain, gait problem and neck pain.   Skin:  Positive for rash (on arms  chronic intermittent saw derm given triamcinolone needs refill).   Neurological:  Negative for dizziness, weakness, light-headedness, numbness and headaches.   Hematological:  Does not bruise/bleed easily.   Psychiatric/Behavioral:  Negative for dysphoric mood and sleep disturbance. The patient is not nervous/anxious.        Objective   /72 (BP Location: Left arm, Patient Position:  "Sitting, Cuff Size: Standard)   Pulse 98   Temp (!) 97 °F (36.1 °C) (Tympanic)   Resp 16   Ht 5' 4\" (1.626 m)   Wt 72.1 kg (159 lb)   LMP 03/21/2025   SpO2 98%   BMI 27.29 kg/m²      Physical Exam  Vitals and nursing note reviewed.   Constitutional:       General: She is not in acute distress.     Appearance: Normal appearance. She is well-developed and normal weight.   HENT:      Head: Normocephalic and atraumatic.      Right Ear: Tympanic membrane, ear canal and external ear normal.      Left Ear: Tympanic membrane, ear canal and external ear normal.      Nose: Nose normal.      Mouth/Throat:      Mouth: Mucous membranes are moist.      Pharynx: Oropharynx is clear. No oropharyngeal exudate or posterior oropharyngeal erythema.   Eyes:      Extraocular Movements: Extraocular movements intact.      Conjunctiva/sclera: Conjunctivae normal.      Pupils: Pupils are equal, round, and reactive to light.   Cardiovascular:      Rate and Rhythm: Normal rate and regular rhythm.      Pulses: Normal pulses.      Heart sounds: Normal heart sounds. No murmur heard.  Pulmonary:      Effort: Pulmonary effort is normal. No respiratory distress.      Breath sounds: Normal breath sounds. No wheezing or rales.   Abdominal:      General: Bowel sounds are normal. There is no distension.      Palpations: Abdomen is soft. There is no mass.      Tenderness: There is no abdominal tenderness. There is no right CVA tenderness, left CVA tenderness, guarding or rebound.      Hernia: No hernia is present.   Musculoskeletal:         General: No swelling or tenderness. Normal range of motion.      Cervical back: Normal range of motion and neck supple.      Right lower leg: No edema.      Left lower leg: No edema.   Skin:     General: Skin is warm and dry.      Capillary Refill: Capillary refill takes less than 2 seconds.      Findings: Rash (few dry patches on arms) present. No lesion.      Comments: No abn  moles seen   Neurological:      " General: No focal deficit present.      Mental Status: She is alert and oriented to person, place, and time.      Cranial Nerves: No cranial nerve deficit.      Sensory: No sensory deficit.      Motor: No weakness.      Coordination: Coordination normal.      Gait: Gait normal.      Deep Tendon Reflexes: Reflexes normal.   Psychiatric:         Mood and Affect: Mood normal.         Behavior: Behavior normal.         Answers submitted by the patient for this visit:  Annual Physical (Submitted on 4/30/2025)  Diet/Nutrition choices: well balanced diet, diabetic diet, limited junk food, low carb diet, consuming 3-5 servings of fruits/vegetables daily  Exercise choices: walking, moderate cardiovascular exercise, 3-4 times a week on average  Sleep choices: sleeps poorly, 4-6 hours of sleep on average  Hearing choices: normal hearing bilateral ears  Vision choices: no vision problems, most recent eye exam < 1 year ago  Dental choices: regular dental visits, brushes teeth twice daily, floss regularly  Do you currently have an OB/GYN provider that you routinely follow with?: Yes  Last menstrual cycle (if applicable):: 3/21/2025  Any history of sexual transmitted disease/infection?: No  Contraception: IUD placement  Do you have an advance directive/living will?: No  Do you have a durable power of  (POA)?: No

## 2025-04-26 ENCOUNTER — APPOINTMENT (OUTPATIENT)
Dept: LAB | Facility: HOSPITAL | Age: 30
End: 2025-04-26
Attending: PREVENTIVE MEDICINE
Payer: COMMERCIAL

## 2025-04-26 ENCOUNTER — APPOINTMENT (OUTPATIENT)
Dept: LAB | Facility: HOSPITAL | Age: 30
End: 2025-04-26
Attending: NURSE PRACTITIONER
Payer: COMMERCIAL

## 2025-04-26 DIAGNOSIS — E28.2 POLYCYSTIC OVARY: ICD-10-CM

## 2025-04-26 DIAGNOSIS — E66.09 CLASS 1 OBESITY DUE TO EXCESS CALORIES WITH SERIOUS COMORBIDITY AND BODY MASS INDEX (BMI) OF 34.0 TO 34.9 IN ADULT: ICD-10-CM

## 2025-04-26 DIAGNOSIS — E01.0 THYROMEGALY: ICD-10-CM

## 2025-04-26 DIAGNOSIS — E55.9 VITAMIN D DEFICIENCY: ICD-10-CM

## 2025-04-26 DIAGNOSIS — Z00.8 HEALTH EXAMINATION IN POPULATION SURVEY: ICD-10-CM

## 2025-04-26 DIAGNOSIS — E66.811 CLASS 1 OBESITY DUE TO EXCESS CALORIES WITH SERIOUS COMORBIDITY AND BODY MASS INDEX (BMI) OF 34.0 TO 34.9 IN ADULT: ICD-10-CM

## 2025-04-26 LAB
25(OH)D3 SERPL-MCNC: 36.6 NG/ML (ref 30–100)
ALBUMIN SERPL BCG-MCNC: 4.4 G/DL (ref 3.5–5)
ALP SERPL-CCNC: 68 U/L (ref 34–104)
ALT SERPL W P-5'-P-CCNC: 12 U/L (ref 7–52)
ANION GAP SERPL CALCULATED.3IONS-SCNC: 8 MMOL/L (ref 4–13)
AST SERPL W P-5'-P-CCNC: 11 U/L (ref 13–39)
BASOPHILS # BLD AUTO: 0.03 THOUSANDS/ÂΜL (ref 0–0.1)
BASOPHILS NFR BLD AUTO: 0 % (ref 0–1)
BILIRUB SERPL-MCNC: 0.44 MG/DL (ref 0.2–1)
BUN SERPL-MCNC: 12 MG/DL (ref 5–25)
CALCIUM SERPL-MCNC: 9.1 MG/DL (ref 8.4–10.2)
CHLORIDE SERPL-SCNC: 107 MMOL/L (ref 96–108)
CHOLEST SERPL-MCNC: 134 MG/DL (ref ?–200)
CO2 SERPL-SCNC: 24 MMOL/L (ref 21–32)
CREAT SERPL-MCNC: 0.73 MG/DL (ref 0.6–1.3)
EOSINOPHIL # BLD AUTO: 0.54 THOUSAND/ÂΜL (ref 0–0.61)
EOSINOPHIL NFR BLD AUTO: 6 % (ref 0–6)
ERYTHROCYTE [DISTWIDTH] IN BLOOD BY AUTOMATED COUNT: 13.4 % (ref 11.6–15.1)
EST. AVERAGE GLUCOSE BLD GHB EST-MCNC: 100 MG/DL
GFR SERPL CREATININE-BSD FRML MDRD: 110 ML/MIN/1.73SQ M
GLUCOSE P FAST SERPL-MCNC: 84 MG/DL (ref 65–99)
HBA1C MFR BLD: 5.1 %
HCT VFR BLD AUTO: 44.2 % (ref 34.8–46.1)
HDLC SERPL-MCNC: 50 MG/DL
HGB BLD-MCNC: 14.4 G/DL (ref 11.5–15.4)
IMM GRANULOCYTES # BLD AUTO: 0.04 THOUSAND/UL (ref 0–0.2)
IMM GRANULOCYTES NFR BLD AUTO: 0 % (ref 0–2)
LDLC SERPL CALC-MCNC: 75 MG/DL (ref 0–100)
LYMPHOCYTES # BLD AUTO: 1.69 THOUSANDS/ÂΜL (ref 0.6–4.47)
LYMPHOCYTES NFR BLD AUTO: 19 % (ref 14–44)
MCH RBC QN AUTO: 28 PG (ref 26.8–34.3)
MCHC RBC AUTO-ENTMCNC: 32.6 G/DL (ref 31.4–37.4)
MCV RBC AUTO: 86 FL (ref 82–98)
MONOCYTES # BLD AUTO: 0.72 THOUSAND/ÂΜL (ref 0.17–1.22)
MONOCYTES NFR BLD AUTO: 8 % (ref 4–12)
NEUTROPHILS # BLD AUTO: 5.92 THOUSANDS/ÂΜL (ref 1.85–7.62)
NEUTS SEG NFR BLD AUTO: 67 % (ref 43–75)
NONHDLC SERPL-MCNC: 84 MG/DL
NRBC BLD AUTO-RTO: 0 /100 WBCS
PLATELET # BLD AUTO: 313 THOUSANDS/UL (ref 149–390)
PMV BLD AUTO: 10 FL (ref 8.9–12.7)
POTASSIUM SERPL-SCNC: 3.8 MMOL/L (ref 3.5–5.3)
PROT SERPL-MCNC: 7.1 G/DL (ref 6.4–8.4)
RBC # BLD AUTO: 5.15 MILLION/UL (ref 3.81–5.12)
SODIUM SERPL-SCNC: 139 MMOL/L (ref 135–147)
TRIGL SERPL-MCNC: 45 MG/DL (ref ?–150)
TSH SERPL DL<=0.05 MIU/L-ACNC: 0.87 UIU/ML (ref 0.45–4.5)
WBC # BLD AUTO: 8.94 THOUSAND/UL (ref 4.31–10.16)

## 2025-04-26 PROCEDURE — 84443 ASSAY THYROID STIM HORMONE: CPT

## 2025-04-26 PROCEDURE — 80061 LIPID PANEL: CPT

## 2025-04-26 PROCEDURE — 36415 COLL VENOUS BLD VENIPUNCTURE: CPT

## 2025-04-26 PROCEDURE — 82306 VITAMIN D 25 HYDROXY: CPT

## 2025-04-26 PROCEDURE — 83036 HEMOGLOBIN GLYCOSYLATED A1C: CPT

## 2025-04-26 PROCEDURE — 80053 COMPREHEN METABOLIC PANEL: CPT

## 2025-04-26 PROCEDURE — 85025 COMPLETE CBC W/AUTO DIFF WBC: CPT

## 2025-04-29 ENCOUNTER — RESULTS FOLLOW-UP (OUTPATIENT)
Dept: BARIATRICS | Facility: CLINIC | Age: 30
End: 2025-04-29

## 2025-04-30 ENCOUNTER — OFFICE VISIT (OUTPATIENT)
Dept: FAMILY MEDICINE CLINIC | Facility: CLINIC | Age: 30
End: 2025-04-30
Payer: COMMERCIAL

## 2025-04-30 VITALS
OXYGEN SATURATION: 98 % | DIASTOLIC BLOOD PRESSURE: 72 MMHG | SYSTOLIC BLOOD PRESSURE: 120 MMHG | BODY MASS INDEX: 27.14 KG/M2 | HEART RATE: 98 BPM | TEMPERATURE: 97 F | HEIGHT: 64 IN | RESPIRATION RATE: 16 BRPM | WEIGHT: 159 LBS

## 2025-04-30 DIAGNOSIS — Z00.00 ANNUAL PHYSICAL EXAM: Primary | ICD-10-CM

## 2025-04-30 DIAGNOSIS — L30.9 ECZEMA, UNSPECIFIED TYPE: ICD-10-CM

## 2025-04-30 DIAGNOSIS — Z76.89 ENCOUNTER FOR WEIGHT MANAGEMENT: ICD-10-CM

## 2025-04-30 DIAGNOSIS — M54.42 ACUTE BILATERAL LOW BACK PAIN WITH LEFT-SIDED SCIATICA: ICD-10-CM

## 2025-04-30 DIAGNOSIS — E01.0 THYROMEGALY: ICD-10-CM

## 2025-04-30 DIAGNOSIS — E66.3 OVERWEIGHT WITH BODY MASS INDEX (BMI) OF 29 TO 29.9 IN ADULT: ICD-10-CM

## 2025-04-30 PROCEDURE — 99395 PREV VISIT EST AGE 18-39: CPT | Performed by: FAMILY MEDICINE

## 2025-04-30 PROCEDURE — 99213 OFFICE O/P EST LOW 20 MIN: CPT | Performed by: FAMILY MEDICINE

## 2025-04-30 RX ORDER — TRIAMCINOLONE ACETONIDE 5 MG/G
CREAM TOPICAL 2 TIMES DAILY
Qty: 30 G | Refills: 3 | Status: SHIPPED | OUTPATIENT
Start: 2025-04-30

## 2025-04-30 RX ORDER — GABAPENTIN 100 MG/1
100 CAPSULE ORAL 3 TIMES DAILY
Qty: 90 CAPSULE | Refills: 3 | Status: SHIPPED | OUTPATIENT
Start: 2025-04-30

## 2025-04-30 NOTE — ASSESSMENT & PLAN NOTE
Pt started PT initially helps but returns to work and has to lift so pain returned pt has MRI next week pain described as deep burning pain will try gabapentin      Orders:  •  gabapentin (Neurontin) 100 mg capsule; Take 1 capsule (100 mg total) by mouth 3 (three) times a day

## 2025-05-02 ENCOUNTER — OFFICE VISIT (OUTPATIENT)
Dept: PHYSICAL THERAPY | Facility: CLINIC | Age: 30
End: 2025-05-02
Attending: FAMILY MEDICINE
Payer: COMMERCIAL

## 2025-05-02 DIAGNOSIS — M54.42 ACUTE BILATERAL LOW BACK PAIN WITH LEFT-SIDED SCIATICA: Primary | ICD-10-CM

## 2025-05-02 PROCEDURE — 97110 THERAPEUTIC EXERCISES: CPT | Performed by: PHYSICAL MEDICINE & REHABILITATION

## 2025-05-02 PROCEDURE — 97140 MANUAL THERAPY 1/> REGIONS: CPT | Performed by: PHYSICAL MEDICINE & REHABILITATION

## 2025-05-02 NOTE — PROGRESS NOTES
"Daily Note     Today's date: 2025  Patient name: Deepa Darling  : 1995  MRN: [id number]  Referring provider: Bernadine Earl MD  Dx:   Encounter Diagnosis     ICD-10-CM    1. Acute bilateral low back pain with left-sided sciatica  M54.42                  Subjective: Patient has been off work and so her pain is low today. Has MRI tomorrow morning, returns to work tomorrow night.     Objective: See treatment diary below    Assessment: Patient tolerated treatment well overall. Able to include initial stabilization activity without issue. Patient c/o pain over L PSIS with movement, found to be in relative L ant inom rotation with landmark palpation and longsit test. Positive response to MET, followed by TPR through posterior hip musculature. Assess response nv and continue as able.     Plan: Continue per plan of care.      Precautions: none    Manuals            L ant rotation MET correction, LH           TPR L posterior hip, LH                              Neuro Re-Ed           Hip adductor isometric  Hooklying w/ yoga block [id number] 15x10\"                                                               Edu Re: postural re-education, lumbar neuroanatomy postural re-education         Ther Ex           REIL 3x10    X10 lock and sag X10    X10 lock and sag         DKTC  2x15 (better) W/ pball 10x5-10\"        Repeated lumbar flexion  Sitting  x15  (better) 15x           Standing Pball press down 2x10x5\"           L SKTC 10x10\"           L piriformis st 10x10\"                   Edu Re: POC, HEP          Ther Activity                                 Gait Training                                 Modalities                                      "

## 2025-05-03 ENCOUNTER — HOSPITAL ENCOUNTER (OUTPATIENT)
Dept: MRI IMAGING | Facility: HOSPITAL | Age: 30
Discharge: HOME | End: 2025-05-03
Payer: COMMERCIAL

## 2025-05-03 DIAGNOSIS — M54.16 LEFT LUMBAR RADICULITIS: ICD-10-CM

## 2025-05-03 PROCEDURE — 72148 MRI LUMBAR SPINE W/O DYE: CPT

## 2025-05-06 ENCOUNTER — OFFICE VISIT (OUTPATIENT)
Dept: PHYSICAL THERAPY | Facility: CLINIC | Age: 30
End: 2025-05-06
Attending: FAMILY MEDICINE
Payer: COMMERCIAL

## 2025-05-06 DIAGNOSIS — E66.811 CLASS 1 OBESITY DUE TO EXCESS CALORIES WITH SERIOUS COMORBIDITY AND BODY MASS INDEX (BMI) OF 34.0 TO 34.9 IN ADULT: ICD-10-CM

## 2025-05-06 DIAGNOSIS — M54.42 ACUTE BILATERAL LOW BACK PAIN WITH LEFT-SIDED SCIATICA: Primary | ICD-10-CM

## 2025-05-06 DIAGNOSIS — E66.09 CLASS 1 OBESITY DUE TO EXCESS CALORIES WITH SERIOUS COMORBIDITY AND BODY MASS INDEX (BMI) OF 34.0 TO 34.9 IN ADULT: ICD-10-CM

## 2025-05-06 PROCEDURE — 97110 THERAPEUTIC EXERCISES: CPT

## 2025-05-06 NOTE — PROGRESS NOTES
"Daily Note     Today's date: 2025  Patient name: Deepa Darling  : 1995  MRN: [id number]  Referring provider: Bernadine Earl MD  Dx:   Encounter Diagnosis     ICD-10-CM    1. Acute bilateral low back pain with left-sided sciatica  M54.42           Start Time: 1505  Stop Time: 1536  Total time in clinic (min): 31 minutes    Subjective: Pt reports that she has been experiencing minor decrease in pain following performance of repeated lumbar flexion; no significant change with SIJ exercises. Reports that the pain in her L groin has been decreasing in area.      Objective: See treatment diary below      Assessment: Low back pain and L groin pain responded positively to lumbar L side glide in standing; provided this for HEP. Educated that if improvement from L side glide stagnates, perform repeated extension to see if this furthers improvement. All treatments tolerated well, no adverse response.      Plan: Continue per plan of care.      Precautions: none    Manuals  5 5/        L ant rotation MET correction, LH         TPR L posterior hip, LH                        Neuro Re-Ed         Hip adductor isometric  Hooklying w/ yoga block [id number] 15x10\"                                                   Edu Re: postural re-education, lumbar neuroanatomy postural re-education       Ther Ex         REIL 3x10    X10 lock and sag X10    X10 lock and sag       DKTC  2x15 (better) W/ pball 10x5-10\"      Repeated lumbar flexion  Sitting  x15  (better) 15x Sitting x15 (no effect)    [id number] (no effect)     Lumbar SG    R SG 2x10 (worse)    L SG 2x15 (better)     LTR    Knees fall left x15 (peripheralize)        Standing Pball press down 2x10x5\"         L SKTC 10x10\"         L piriformis st 10x10\"               Edu Re: POC, HEP   HEP     Ther Activity                           Gait Training                           Modalities                                  "

## 2025-05-06 NOTE — HOME EXERCISE EDUCATION
Program_ID:[id number]   Access Code: [email or web address]  URL: [email or web address]  Date: 05-  Prepared By: Joe Velasquez    Program Notes      Exercises      - Lumbar Side [place] at Wall - 5-8 x daily -  x weekly -  sets - 10-15 reps      - Prone Press Up - 5 x daily -  x weekly -  sets - 10-15 reps

## 2025-05-07 RX ORDER — TIRZEPATIDE 5 MG/.5ML
5 INJECTION, SOLUTION SUBCUTANEOUS WEEKLY
Qty: 2 ML | Refills: 0 | Status: SHIPPED | OUTPATIENT
Start: 2025-05-07 | End: 2025-07-30

## 2025-05-08 ENCOUNTER — PROCEDURE VISIT (OUTPATIENT)
Age: 30
End: 2025-05-08
Payer: COMMERCIAL

## 2025-05-08 VITALS — BODY MASS INDEX: 27.14 KG/M2 | HEIGHT: 64 IN | WEIGHT: 159 LBS

## 2025-05-08 DIAGNOSIS — M99.02 SEGMENTAL DYSFUNCTION OF THORACIC REGION: ICD-10-CM

## 2025-05-08 DIAGNOSIS — M99.03 SEGMENTAL DYSFUNCTION OF LUMBAR REGION: ICD-10-CM

## 2025-05-08 DIAGNOSIS — M53.3 SACROILIAC JOINT DYSFUNCTION: ICD-10-CM

## 2025-05-08 DIAGNOSIS — M79.18 MYOFASCIAL PAIN: ICD-10-CM

## 2025-05-08 DIAGNOSIS — M54.16 LEFT LUMBAR RADICULITIS: Primary | ICD-10-CM

## 2025-05-08 DIAGNOSIS — M47.816 LUMBAR FACET JOINT SYNDROME: ICD-10-CM

## 2025-05-08 PROCEDURE — 98941 CHIROPRACT MANJ 3-4 REGIONS: CPT | Performed by: CHIROPRACTOR

## 2025-05-08 NOTE — PROGRESS NOTES
Assessment:  The patient has not had chiropractic care since April 25, 2025.  She has been undergoing physical therapy.  The patient's MRI report of the lumbar spine was reviewed prior to chiropractic treatment today.  The patient's MRI report of the lumbar spine was also reviewed with the patient.    Narrative & Impression   MRI LUMBAR SPINE WITHOUT CONTRAST     INDICATION: M54.16: Radiculopathy, lumbar region.     COMPARISON: Lumbar spine radiographs 2/14/2025.     TECHNIQUE:  Multiplanar, multisequence imaging of the lumbar spine was performed. .        IMAGE QUALITY: Motion-degraded, which reduces diagnostic sensitivity.     FINDINGS:     VERTEBRAL BODIES: Normal alignment. No acute fracture.     SACRUM: No acute abnormality.     DISTAL CORD AND CONUS:  Normal size and signal within the distal cord and conus.     PARASPINAL SOFT TISSUES: No acute abnormality.     LOWER THORACIC DISC SPACES: No significant spondylotic changes.     LUMBAR DISC SPACES: No significant degenerative disc disease.     L1-L2: No significant neuroforaminal narrowing or spinal canal stenosis.     L2-L3: No significant neuroforaminal narrowing or spinal canal stenosis.     L3-L4: No significant neuroforaminal narrowing or spinal canal stenosis.     L4-L5: No significant neuroforaminal narrowing or spinal canal stenosis.     L5-S1: No significant neuroforaminal narrowing or spinal canal stenosis.     IMPRESSION:     No significant spondylotic changes of the lumbar spine.        Workstation performed: [id number]        The patient was referred for a spine and pain medicine evaluation.    1. Myofascial pain        2. Segmental dysfunction of lumbar region        3. Segmental dysfunction of thoracic region        4. Sacroiliac joint dysfunction        5. Lumbar facet joint syndrome          Plan:  Treatment today consisted of myofascial release via ischemic compression to the left lumbar erector spinae, left quadratus lumborum, left gluteus  medius, and left piriformis.    Manipulation was performed to the left sacroiliac joint via Jacobs low force drop technique. Contact was made to the left ischial tuberosity and the right lateral aspect of the apex of the sacrum. Manipulation was performed to the lumbar restrictions via manual lumbar flexion distraction technique. Manipulation was performed to the thoracic restrictions via grade 2 mobilization techniques and low force diversified maneuvers.  No high velocity thrust was applied.  An audible release occurred and was well-tolerated.    PNF stretching was performed to the left gluteus medius and piriformis.    Subjective:  The patient reported that she she continues to experience pain in the left sacroiliac/superior gluteal region a when working.  The patient stated she had no longer experiencing the symptoms in the mid and distal gluteal region or posterior thigh.  Instead her pain is localized to the left sacroiliac and superior gluteal region.  She stated she feels okay when she is not working but is very limited in her movements and ability to mop when at work.  The patient stated that she has attended physical therapy but has not noticed a significant improvement.  The patient questioned whether she should have an evaluation with spine and pain medicine as her pain has been persistent despite conservative care. She has decreased pain in the left lower back/sacroiliac region and left medial superior to mid/distal gluteal region with doing the prescribed stretching exercises. The patient reported feeling the same in the midline/medial upper thoracic region.  The patient has decreased midline/medial upper thoracic region symptoms with doing the prescribed pectoralis/subscapularis stretch.  The patient describes her pain level as an 8 on a 0-10 pain scale when at work.  The patient also describes experiencing bilateral medial groin/proximal inner thigh pain.  The patient was question whether she is  doing exercises and physical therapy that required her to squeeze a ball between her knees and she stated that she does this exercise in physical therapy.  She also does a home physical therapy program.    Objective:  Negative Derefield on the left. Active myofascial trigger points were present in the left lumbar erector spinae, left quadratus lumborum, left gluteus medius, and left piriformis. Trigger point activity and hypertonicity are not significantly pronounced in the in the above listed musculature.  Decreased tenderness is noted in the left lower lumbar erector spinae when compared earlier chiropractic visits.  Active myofascial trigger points were present in the bilateral superior trapezius, levator scapulae, and upper to mid thoracic portion of the spinous thoracis. Intersegmental motion was decreased in the thoracic spine including joint dysfunctions at T4-5, T7-8, and T10-11.  Intersegmental motion was decreased in the lumbar spine including joint dysfunctions at L2-3 and L3-4. Intersegmental motion was decreased in the left sacroiliac joint.         I have used the Epic copy/forward function to compose this note. I have reviewed my current note to ensure it reflects the current patient status, exam, assessment and plan.    Dictation voice to text software has been used in the creation of this document. Please consider this in light of any contextual or grammatical errors.

## 2025-05-09 DIAGNOSIS — M54.42 ACUTE BILATERAL LOW BACK PAIN WITH LEFT-SIDED SCIATICA: ICD-10-CM

## 2025-05-09 RX ORDER — METHOCARBAMOL 500 MG/1
500 TABLET, FILM COATED ORAL
Qty: 30 TABLET | Refills: 0 | Status: SHIPPED | OUTPATIENT
Start: 2025-05-09

## 2025-05-13 ENCOUNTER — OFFICE VISIT (OUTPATIENT)
Dept: PHYSICAL THERAPY | Facility: CLINIC | Age: 30
End: 2025-05-13
Attending: FAMILY MEDICINE
Payer: COMMERCIAL

## 2025-05-13 DIAGNOSIS — M54.42 ACUTE BILATERAL LOW BACK PAIN WITH LEFT-SIDED SCIATICA: Primary | ICD-10-CM

## 2025-05-13 PROCEDURE — 97110 THERAPEUTIC EXERCISES: CPT

## 2025-05-13 NOTE — PROGRESS NOTES
"Daily Note     Today's date: 2025  Patient name: Deepa Darling  : 1995  MRN: [id number]  Referring provider: Bernadine Earl MD  Dx:   Encounter Diagnosis     ICD-10-CM    1. Acute bilateral low back pain with left-sided sciatica  M54.42             Start Time: 1525  Stop Time: 1600  Total time in clinic (min): 35 minutes    Subjective: Pt reports that she is still experiencing pain, especially when mopping. Finds that repeated extension in lying reduces pain. She reports that pain is not traveling as far down her leg now; only to the glute now. Pain in the groin has not changed.       Objective: See treatment diary below      Assessment: Great improvement in low back pain with REIL w/ clinician overpressure. Encouraged performance of REIL with hands higher to target upper lumbar spine more; also encouraged pt to find a way to reproduce REIL with belt fixation. We discussed use of a heavy backpack or belt fixation with ironing board flat on the ground. All treatments tolerated well, no adverse response.      Plan: Continue per plan of care.      Precautions: none    Manuals        L ant rotation MET correction, LH         TPR L posterior hip, LH                        Neuro Re-Ed         Hip adductor isometric  Hooklying w/ yoga block [id number] 15x10\"                                                   Edu Re: postural re-education, lumbar neuroanatomy postural re-education       Ther Ex         REIL 3x10    X10 lock and sag X10    X10 lock and sag   x15    W/ clinician OP 3x10 (better)    Lock and sag x10    Lock and sag w/ hands more superior x10    DKTC  2x15 (better) W/ pball 10x5-10\"      Repeated lumbar flexion  Sitting  x15  (better) 15x Sitting x15 (no effect)    [id number] (no effect)     Lumbar SG    R SG 2x10 (worse)    L SG 2x15 (better)     LTR    Knees fall left x15 (peripheralize)        Standing Pball press down 2x10x5\"      Glute bridge     2x10       L " "SKTC 10x10\"         L piriformis st 10x10\"               Edu Re: POC, HEP   HEP     Ther Activity                           Gait Training                           Modalities                                  "

## 2025-05-16 ENCOUNTER — PROCEDURE VISIT (OUTPATIENT)
Age: 30
End: 2025-05-16
Payer: COMMERCIAL

## 2025-05-16 VITALS — WEIGHT: 159 LBS | HEIGHT: 64 IN | BODY MASS INDEX: 27.14 KG/M2

## 2025-05-16 DIAGNOSIS — M99.03 SEGMENTAL DYSFUNCTION OF LUMBAR REGION: ICD-10-CM

## 2025-05-16 DIAGNOSIS — M54.16 LEFT LUMBAR RADICULITIS: Primary | ICD-10-CM

## 2025-05-16 DIAGNOSIS — M99.02 SEGMENTAL DYSFUNCTION OF THORACIC REGION: ICD-10-CM

## 2025-05-16 DIAGNOSIS — M53.3 SACROILIAC JOINT DYSFUNCTION: ICD-10-CM

## 2025-05-16 DIAGNOSIS — M79.18 MYOFASCIAL PAIN: ICD-10-CM

## 2025-05-16 DIAGNOSIS — M47.816 LUMBAR FACET JOINT SYNDROME: ICD-10-CM

## 2025-05-16 PROCEDURE — 98941 CHIROPRACT MANJ 3-4 REGIONS: CPT | Performed by: CHIROPRACTOR

## 2025-05-16 PROCEDURE — 97110 THERAPEUTIC EXERCISES: CPT | Performed by: CHIROPRACTOR

## 2025-05-16 NOTE — PROGRESS NOTES
Assessment:  The patient was taught a Carrington extension exercise to perform at 0 - 10: 3 sets of 0 - 10: 10 repetitions 0 - 10: 3 time per day.    1. Myofascial pain        2. Segmental dysfunction of lumbar region        3. Segmental dysfunction of thoracic region        4. Sacroiliac joint dysfunction        5. Lumbar facet joint syndrome          Plan:  Treatment today consisted of manipulation to the left sacroiliac joint via Jacobs low force drop technique. Contact was made to the left ischial tuberosity and the right lateral aspect of the apex of the sacrum. Manipulation was performed to the lumbar restrictions via manual lumbar flexion distraction technique. Manipulation was performed to the mid to thoracic restrictions via grade 2 mobilization techniques and low force diversified maneuvers.  No high velocity thrust was applied.  An audible release occurred and was well-tolerated.    Therapeutic procedures were performed with the goal of improving flexibility and range of motion and therefore function. I performed PNF stretching to the bilateral gluteus medius and piriformis.  Additionally the patient performed active movements with passive assisted stretching to the bilateral lumbar erector spinae. The above mentioned were performed with the goal of improving range of motion and flexibility.  Carrington flexion exercise with overpressure and Carrington extension exercise with overpressure were also performed.  The therapeutic procedures/exercises were performed for at least 8 minutes.    Discussion:  The patient indicated having relief with pulling her knees to their chest while she is in a fully squatted position and therefore I had the patient do a set of Carrington flexion exercises with overpressure.  I advised the patient to do the exercises at home at 3 sets of 10 repetitions a few times a day.  After doing the exercise she stated that she had been doing that exercise as part of her physical therapy.   "However she stated she noticed most relief with doing Carrington extension exercise with overpressure.  She states she does not do Carrington extension exercises and may have 4 to 5 hours of relief but the pain then returns.  Therefore the Carrington extension exercise with overpressure was performed.  I initially applied overpressure to the lumbosacral region.  However she informed me that it was discovered in physical therapy that she had most relief with Carrington extension exercises with overpressure to the lower thoracic/thoracolumbar region.  Therefore I contacted the lower thoracic/thoracolumbar region as the patient did the Carrington extension exercise.  I informed the patient that typically Carrington flexion or Carrington extension exercises are prescribed and not necessarily both.  Because she has most relief with the Carrington extension exercises I suggested that she do the Carrington extension exercise at 3 sets of 10 repetitions 3 times per day.  She indicates she understood.  Afterwards manipulation was performed to the lower thoracic restrictions via grade 2 mobilization techniques and low force diversified maneuvers.    Subjective:  The patient reported that she she continues to experience pain in the bilateral medial lower lumbar/sacroiliac region and superior gluteal region when working.  She stated that she has found that when she squats fully down to the floor while pulling her knees to her chest she is aware of relief of lower back symptoms.  She stated she is able to then continue to work but stated that she needs to do this frequently throughout the day.  She stated, \"this is not normal.\"  Once again the patient stated she had no longer experiencing the symptoms in the mid and distal gluteal region or posterior thigh region.  She reported that she needs to change the way she does activities such as reaching to open the door because those activities can aggravate her lower back if she opens the door the way " she typically does.  She indicated she uses her right upper extremity more without her trunk.  The patient describes her pain level as a 5 on a 0-10 pain scale.  She described her hip pain as a 0 on a 0-10 pain scale.    Objective:  Negative Derefield on the left. Active myofascial trigger points were present in the bilateral lumbar erector spinae, left quadratus lumborum, and bilateral gluteus medius.  Trigger point activity and hypertonicity are less pronounced in the bilateral lumbar erector spinae. Active myofascial trigger points were present in the bilateral superior trapezius, levator scapulae, and upper to mid thoracic portion of the spinous thoracis. Intersegmental motion was decreased in the thoracic spine including joint dysfunctions at T4-5, T7-8, and T10-11.  Intersegmental motion was decreased in the lumbar spine including joint dysfunctions at L2-3 and L3-4. Intersegmental motion was decreased in the left sacroiliac joint.         I have used the Epic copy/forward function to compose this note. I have reviewed my current note to ensure it reflects the current patient status, exam, assessment and plan.    Dictation voice to text software has been used in the creation of this document. Please consider this in light of any contextual or grammatical errors.

## 2025-05-16 NOTE — PATIENT INSTRUCTIONS
The patient was advised to continue with the icing and stretching instructions.   The patient was advised to continue with the home Carrington extension exercises.

## 2025-05-20 ENCOUNTER — OFFICE VISIT (OUTPATIENT)
Dept: PHYSICAL THERAPY | Facility: CLINIC | Age: 30
End: 2025-05-20
Attending: FAMILY MEDICINE
Payer: COMMERCIAL

## 2025-05-20 DIAGNOSIS — M54.42 ACUTE BILATERAL LOW BACK PAIN WITH LEFT-SIDED SCIATICA: Primary | ICD-10-CM

## 2025-05-20 PROCEDURE — 97110 THERAPEUTIC EXERCISES: CPT

## 2025-05-20 NOTE — PROGRESS NOTES
"Daily Note     Today's date: 2025  Patient name: Deepa Darling  : 1995  MRN: [id number]  Referring provider: Bernadine Earl MD  Dx:   Encounter Diagnosis     ICD-10-CM    1. Acute bilateral low back pain with left-sided sciatica  M54.42           Start Time: 1633  Stop Time: 1701  Total time in clinic (min): 28 minutes    Subjective: Pt reports that her low back and hip pain has improved since previous appointment, however the pain relief she gets following REIL does not last for greater than 4 hours. Reports that her friend has been helping her with REIL by applying overpressure.      Objective: See treatment diary below      Assessment: Significant improvement with REIL + clinician OP and REIL w/ belt fixation. Encouraged continued performance of REIL for HEP with belt fixation using a belt and ironing board or having her friend provide overpressure. All treatments tolerated well, no adverse response.      Plan: Continue per plan of care.      Precautions: none    Manuals       L ant rotation MET correction, LH         TPR L posterior hip, LH                        Neuro Re-Ed         Hip adductor isometric  Hooklying w/ yoga block [id number] 15x10\"                                                   Edu Re: postural re-education, lumbar neuroanatomy postural re-education       Ther Ex         REIL 3x10    X10 lock and sag X10    X10 lock and sag   x15    W/ clinician OP 3x10 (better)    Lock and sag x10    Lock and sag w/ hands more superior x10 W/ clinician OP 4x10    W/ belt fixation 4x10   DKTC  2x15 (better) W/ pball 10x5-10\"      Repeated lumbar flexion  Sitting  x15  (better) 15x Sitting x15 (no effect)    [id number] (no effect)     Lumbar SG    R SG 2x10 (worse)    L SG 2x15 (better)     LTR    Knees fall left x15 (peripheralize)        Standing Pball press down 2x10x5\"      Glute bridge     2x10       L SKTC 10x10\"         L piriformis st 10x10\"           "     Edu Re: POC, HEP   HEP     Ther Activity                           Gait Training                           Modalities

## 2025-05-22 ENCOUNTER — EVALUATION (OUTPATIENT)
Dept: PHYSICAL THERAPY | Facility: CLINIC | Age: 30
End: 2025-05-22
Attending: FAMILY MEDICINE
Payer: COMMERCIAL

## 2025-05-22 DIAGNOSIS — M54.42 ACUTE BILATERAL LOW BACK PAIN WITH LEFT-SIDED SCIATICA: Primary | ICD-10-CM

## 2025-05-22 PROCEDURE — 97110 THERAPEUTIC EXERCISES: CPT

## 2025-05-22 NOTE — PROGRESS NOTES
PT Re-Evaluation     Today's date: 2025  Patient name: Deepa Darling  : 1995  MRN: [id number]  Referring provider: Bernadine Earl MD  Dx:   Encounter Diagnosis     ICD-10-CM    1. Acute bilateral low back pain with left-sided sciatica  M54.42           Start Time: 1602  Stop Time: 1631  Total time in clinic (min): 29 minutes    Assessment  Impairments: abnormal or restricted ROM, activity intolerance, pain with function, participation limitations, activity limitations and endurance    Assessment details: SIJ testing negative    Problem List:  1) Lumbar spine hypomobility    Deepa Darling is a pleasant 30 y.o. female who presents with L>R low back pain with radiculopathy. She has lumbar spine hypomobility resulting low back pain with radicular symptoms, causing pain and difficulty with activities that involve forward bending, lifting, standing for prolonged periods of time, and walking. Repeated motions examination still demonstrates lumbar derangement syndrome with directional preference into extension, as pain abolished with ambulation and forward bending. SIJ testing is negative. I expect she will continue to improve with lumbar extension exercise progression.        Comparable signs:  1) Standing for >20 minutes  2) Resisted knee flexion and extension  3) Walking long distances    Goals  Short term (1-3 weeks)  1. Pt will perform prescribed HEP independently. -- met 100%  2. Pt will be able to manage symptoms independently. -- met 100%  Long term (12 weeks)  1. Pt will report ability to stand and walk for >20 minutes. -- met 100%  2. Pt will report ability to perform full work duties without increase in low back and hip pain. -- progressing 25%  3. Pt will demo ability to lift 30# weight from the floor without increase in low back and hip pain. -- progressing 25%    Plan  Patient would benefit from: skilled physical therapy and home program  Planned modality interventions:  biofeedback, thermotherapy: hydrocollator packs, cryotherapy and unattended electrical stimulation    Planned therapy interventions: abdominal trunk stabilization, joint mobilization, manual therapy, neuromuscular re-education, strengthening, flexibility, stretching, therapeutic activities, functional ROM exercises, therapeutic exercise and home exercise program    Frequency: 1-2x week  Duration in weeks: 12  Plan of Care beginning date: 2025  Plan of Care expiration date: 2025  Treatment plan discussed with: patient      Subjective Evaluation    History of Present Illness  Mechanism of injury: Pt reports that overall, her pain has been improving. The pain is more frequently located at the midline of her low back/tailbone and severity is reducing. 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 She experiences nearly full abolition of pain with repeated extension in lying with clinician overpressure, however the effects of this do not last for long.    Goals: Tennis, soccer, work      Pain  Current pain rating: 3  At best pain ratin  At worst pain ratin        Objective      Dermatomes R L   L1 WNL WNL   L2 WNL hypo   L3 WNL hypo   L4 WNL WNL   L5 WNL WNL   S1 WNL WNL   S2 WNL WNL      MMT R L   Hip flexion 5 5   Hip extension     Hip abduction     Hip adduction     Hip ER     Hip IR     Knee flexion 5* (better after REIL) 5   Knee extension 5 5   Ankle DF 5 5   Ankle PF 5 5   Ankle inversion     Ankle eversion       AROM R L   Hip flexion WNL WNL   Hip extension     Hip abduction     Hip adduction     Hip ER WNL WNL   Hip IR WNL WNL   Knee flexion WNL WNL   Knee extension WNL WNL   Ankle DF WNL WNL   Ankle PF WNL WNL   Ankle inversion     Ankle eversion       AROM R L   Lumbar flexion WNL   Lumbar extension Limited 25%   Lumbar side glide WNL WNL     Slump: (+) R, (-) L    Repeated movements examination Reponse   REIL w/ clinician OP No effect,  "abolish      SIJ testing:  Compression (-)  Distraction (-)  Sacral thrust (-)       Precautions: none  Manuals 4/18 4/22 5/2 5/6 5/13 5/20      L ant rotation MET correction, LH         TPR L posterior hip, LH                        Neuro Re-Ed   5/2      Hip adductor isometric   15x10\"                                                   Edu         Ther Ex   5/2      REIL W/ clinician OP 3x10    x15    W/ clinician OP 3x10 (better)    Lock and sag x10    Lock and sag w/ hands more superior x10 W/ clinician OP 4x10    W/ belt fixation 4x10   DEBBIE W/ belt OP  3x10        Repeated lumbar flexion   15x Sitting x15 (no effect)    50p0vdh (no effect)     Lumbar SG    R SG 2x10 (worse)    L SG 2x15 (better)     LTR    Knees fall left x15 (peripheralize)        Standing Pball press down 2x10x5\"      Glute bridge     2x10       L SKTC 10x10\"         L piriformis st 10x10\"               Edu Objective findings, POC, HEP   HEP     Ther Activity                           Gait Training                           Modalities                                    "

## 2025-05-29 ENCOUNTER — OFFICE VISIT (OUTPATIENT)
Dept: BARIATRICS | Facility: CLINIC | Age: 30
End: 2025-05-29
Payer: COMMERCIAL

## 2025-05-29 VITALS
WEIGHT: 155.4 LBS | HEART RATE: 102 BPM | BODY MASS INDEX: 26.53 KG/M2 | SYSTOLIC BLOOD PRESSURE: 110 MMHG | DIASTOLIC BLOOD PRESSURE: 70 MMHG | OXYGEN SATURATION: 98 % | HEIGHT: 64 IN

## 2025-05-29 DIAGNOSIS — E66.09 CLASS 1 OBESITY DUE TO EXCESS CALORIES WITH SERIOUS COMORBIDITY AND BODY MASS INDEX (BMI) OF 34.0 TO 34.9 IN ADULT: ICD-10-CM

## 2025-05-29 DIAGNOSIS — E66.811 CLASS 1 OBESITY DUE TO EXCESS CALORIES WITH SERIOUS COMORBIDITY AND BODY MASS INDEX (BMI) OF 34.0 TO 34.9 IN ADULT: ICD-10-CM

## 2025-05-29 PROCEDURE — 99214 OFFICE O/P EST MOD 30 MIN: CPT | Performed by: NURSE PRACTITIONER

## 2025-05-29 RX ORDER — TIRZEPATIDE 5 MG/.5ML
5 INJECTION, SOLUTION SUBCUTANEOUS WEEKLY
Qty: 2 ML | Refills: 2 | Status: SHIPPED | OUTPATIENT
Start: 2025-05-29 | End: 2025-08-21

## 2025-05-29 NOTE — ASSESSMENT & PLAN NOTE
- Patient is pursuing Conservative Program and follow up visits with medical weight management provider  - Initial weight loss goal of 5-10% weight loss for improved health. Weight loss can improve patient's co-morbid conditions and/or prevent weight-related complications.  - Explained the importance of continuing lifestyle changes in addition to any anti-obesity medications.   - Labs reviewed from 4/2025    General Recommendations:  Nutrition:  Eat breakfast daily.  Do not skip meals.      Food log (ie.) www.[email or web address], sparkpeople.com, loseit.com, calorieking.com, etc.     Practice mindful eating.  Be sure to set aside time to eat, eat slowly, and savor your food.     Hydration:    At least 64oz of water daily.  No sugar sweetened beverages.  No juice (eat the fruit instead).     Exercise:  Studies have shown that the ideal exercise goal is somewhere between 150 to 300 minutes of moderate intensity exercise a week.  Start with exercising 10 minutes every other day and gradually increase physical activity with a goal of at least 150 minutes of moderate intensity exercise a week, divided over at least 3 days a week.  An example of this would be exercising 30 minutes a day, 5 days a week.  Resistance training can increase muscle mass and increase our resting metabolic rate.   FULL BODY resistance training is recommended 2-3 times a week.  Do not do this on consecutive days to allow for muscle recovery.     Aim for a bare minimum 5000 steps, even on days you do not exercise.     Monitoring:   Weigh yourself daily.  If this causes undue stress, then just weigh yourself once a week.  Weigh yourself the same time of the day with the same amount of clothing on.  Preferably this should be done after waking up, before you eat, and with no clothing or minimal clothing on.     Specific Goals:  Calorie goal:  0538-1579 armen/day   Patient lifestyle habits were reviewed and she was congratulated on her continued weight  loss.  Nutrition was discussed and she will continue with her balanced meal plan, protein focused foods, and portion control.  She will continue to follow with the dietitian in the future to continue to work on nutrition requirements long term.  Medications were discussed and she is tolerating Zepbound without any side effects and is seeing successful weight loss at the 5 mg dose.  She will stay at this dose until she reaches her goal weight.  She will follow-up in 3 months and continue to evaluate her weight loss.  A maintenance plan will be developed when she has her weight loss goals and she will likely revisit with the dietitian to discuss nutrition for weight maintenance.

## 2025-05-29 NOTE — PROGRESS NOTES
Assessment/Plan:     Class 1 obesity due to excess calories with serious comorbidity and body mass index (BMI) of 34.0 to 34.9 in adult  - Patient is pursuing Conservative Program and follow up visits with medical weight management provider  - Initial weight loss goal of 5-10% weight loss for improved health. Weight loss can improve patient's co-morbid conditions and/or prevent weight-related complications.  - Explained the importance of continuing lifestyle changes in addition to any anti-obesity medications.   - Labs reviewed from 4/2025    General Recommendations:  Nutrition:  Eat breakfast daily.  Do not skip meals.      Food log (ie.) www.ClickGanic.com, sparkpeople.com, loseit.com, miCab.com, etc.     Practice mindful eating.  Be sure to set aside time to eat, eat slowly, and savor your food.     Hydration:    At least 64oz of water daily.  No sugar sweetened beverages.  No juice (eat the fruit instead).     Exercise:  Studies have shown that the ideal exercise goal is somewhere between 150 to 300 minutes of moderate intensity exercise a week.  Start with exercising 10 minutes every other day and gradually increase physical activity with a goal of at least 150 minutes of moderate intensity exercise a week, divided over at least 3 days a week.  An example of this would be exercising 30 minutes a day, 5 days a week.  Resistance training can increase muscle mass and increase our resting metabolic rate.   FULL BODY resistance training is recommended 2-3 times a week.  Do not do this on consecutive days to allow for muscle recovery.     Aim for a bare minimum 5000 steps, even on days you do not exercise.     Monitoring:   Weigh yourself daily.  If this causes undue stress, then just weigh yourself once a week.  Weigh yourself the same time of the day with the same amount of clothing on.  Preferably this should be done after waking up, before you eat, and with no clothing or minimal clothing on.     Specific  Goals:  Calorie goal:  7198-2806 armen/day   Patient lifestyle habits were reviewed and she was congratulated on her continued weight loss.  Nutrition was discussed and she will continue with her balanced meal plan, protein focused foods, and portion control.  She will continue to follow with the dietitian in the future to continue to work on nutrition requirements long term.  Medications were discussed and she is tolerating Zepbound without any side effects and is seeing successful weight loss at the 5 mg dose.  She will stay at this dose until she reaches her goal weight.  She will follow-up in 3 months and continue to evaluate her weight loss.  A maintenance plan will be developed when she has her weight loss goals and she will likely revisit with the dietitian to discuss nutrition for weight maintenance.           Deepa was seen today for follow-up.    Diagnoses and all orders for this visit:    Class 1 obesity due to excess calories with serious comorbidity and body mass index (BMI) of 34.0 to 34.9 in adult  -     tirzepatide (Zepbound) 5 mg/0.5 mL auto-injector; Inject 0.5 mL (5 mg total) under the skin once a week        Total time spent reviewing chart, interviewing patient, examining patient, discussing plan, answering all questions, and documentin minutes with >50% face-to-face time with the patient.    Follow up in approximately 3 months with Non-Surgical Physician/Advanced Practitioner.    Subjective:   Chief Complaint   Patient presents with    Follow-up       Patient ID: Deepa Darling  is a 30 y.o. female with excess weight/obesity here to pursue weight management.  Patient is pursuing Conservative Program.   Most recent notes and records were reviewed.    HPI    Wt Readings from Last 20 Encounters:   25 70.5 kg (155 lb 6.4 oz)   25 72.1 kg (159 lb)   25 72.1 kg (159 lb)   25 72.1 kg (159 lb)   25 74.8 kg (165 lb)   25 74.8 kg (165 lb)   25  "74.8 kg (165 lb)   04/10/25 76.7 kg (169 lb)   04/07/25 76.7 kg (169 lb)   04/04/25 76.7 kg (169 lb)   03/28/25 76.7 kg (169 lb)   03/24/25 76.7 kg (169 lb)   03/21/25 76.7 kg (169 lb)   03/17/25 76.7 kg (169 lb)   03/14/25 76.7 kg (169 lb)   03/07/25 76.7 kg (169 lb)   02/28/25 76.7 kg (169 lb)   02/26/25 76.7 kg (169 lb 3.2 oz)   02/24/25 79.8 kg (176 lb)   02/21/25 79.8 kg (176 lb)       Patient presents today to medical weight management office for follow up.  Patient continues on Zepbound 5mg and is tolerating the medication well.  She states the medication has dramatically improved her appetite and her portion control. She stays consistent with healthier eating and her portions are much smaller.  She is incorporating protein throughout the day, avoiding skipping meals, and prepping her meals for work.    Patient continues to feel significant improvement in her mental health, specifically her anxiety, since starting the medication. She feels much happier and \"like a different person\".      Weight loss medication and dose: Zepbound 5mg  Started weight and date: 203.4 lbs in 11/2024  Current weight: 155.4 lbs (169.2 lbs at last OV)  Difference: -48 lbs (-13.8 lbs since last OV)  Percentage of weight loss: -23.5%  Goal weight: 140-145 lbs    Starting BMI: 34.91 in 11/2024  Current BMI: 26.67    Waist Measurements:  11/2025: 39 in  2/2024: 33.5 in  5/2025: 31.5 in    MWM Weights:   11/2024: 203.4 lbs *start of Zepbound  2/2025: 169.2 lbs (-34.2 lbs)  5/2025: 155.4 lbs (-13.8 lbs)    Nutrition Prescription  Calories: 1,200-1,400 kcal  Protein: 65-82 g  Fluid: 64 ounces    Diet recall:  Night shift (grazing throughout the day)  B: eggs and jimenez and avocado OR eggs and salad OR yogurt and granola  L: pork/chicken/steak with quinoa or sweet potatoes OR cream of vegetable soup  D:  pork/chicken/steak with quinoa or sweet potatoes OR cream of vegetable soup  S: yogurt OR green egg  Take out frequency: 3-4 times per " "week    Hydration: water, 1 juice, 4 sodas per week  Alcohol: rare  Smoking: no  Exercise: 2 times a week weight lifting  Occupation: works in housekeeping  Sleep: not good - sleeping about 5 hours  STOP ban/8      The following portions of the patient's history were reviewed and updated as appropriate: allergies, current medications, past family history, past medical history, past social history, past surgical history, and problem list.    Family History[1]     Review of Systems   Constitutional:  Negative for fatigue.   HENT:  Negative for sore throat.    Respiratory:  Negative for cough and shortness of breath.    Cardiovascular:  Negative for chest pain, palpitations and leg swelling.   Gastrointestinal:  Negative for abdominal pain, constipation, diarrhea and nausea.   Genitourinary:  Negative for dysuria.   Musculoskeletal:  Negative for arthralgias and back pain.   Skin:  Negative for rash.   Neurological:  Negative for headaches.   Psychiatric/Behavioral:  Negative for dysphoric mood. The patient is not nervous/anxious.        Objective:  /70   Pulse 102   Ht 5' 4\" (1.626 m)   Wt 70.5 kg (155 lb 6.4 oz)   LMP 2025 (Approximate)   SpO2 98%   BMI 26.67 kg/m²     Physical Exam  Vitals and nursing note reviewed.   Constitutional:       Appearance: Normal appearance. She is obese.   HENT:      Head: Normocephalic.   Pulmonary:      Effort: Pulmonary effort is normal.     Neurological:      General: No focal deficit present.      Mental Status: She is alert and oriented to person, place, and time.     Psychiatric:         Mood and Affect: Mood normal.         Behavior: Behavior normal.         Thought Content: Thought content normal.         Judgment: Judgment normal.            Labs   Most recent labs reviewed   Lab Results   Component Value Date    SODIUM 139 2025    K 3.8 2025     2025    CO2 24 2025    AGAP 8 2025    BUN 12 2025    CREATININE 0.73 " 04/26/2025    GLUC 81 12/31/2023    GLUF 84 04/26/2025    CALCIUM 9.1 04/26/2025    AST 11 (L) 04/26/2025    ALT 12 04/26/2025    ALKPHOS 68 04/26/2025    TP 7.1 04/26/2025    TBILI 0.44 04/26/2025    EGFR 110 04/26/2025     Lab Results   Component Value Date    HGBA1C 5.1 04/26/2025     Lab Results   Component Value Date    XME2BQVVXHDN 0.870 04/26/2025     Lab Results   Component Value Date    CHOLESTEROL 134 04/26/2025     Lab Results   Component Value Date    HDL 50 04/26/2025     Lab Results   Component Value Date    TRIG 45 04/26/2025     Lab Results   Component Value Date    LDLCALC 75 04/26/2025          [1]   Family History  Problem Relation Name Age of Onset    Thyroid disease Mother Edilsa     Hypertension Father Angel     Hypertension Sister Felicia     Heart attack Maternal Grandfather Pj Cole     Thyroid cancer Paternal Grandmother      Cancer Paternal Grandfather Angel Bhupinder         Cancer de prostata    Heart attack Maternal Uncle Nilio Cole     Thyroid cancer Paternal Aunt

## 2025-05-30 ENCOUNTER — OFFICE VISIT (OUTPATIENT)
Dept: PHYSICAL THERAPY | Facility: CLINIC | Age: 30
End: 2025-05-30
Attending: FAMILY MEDICINE
Payer: COMMERCIAL

## 2025-05-30 DIAGNOSIS — M54.42 ACUTE BILATERAL LOW BACK PAIN WITH LEFT-SIDED SCIATICA: Primary | ICD-10-CM

## 2025-05-30 PROCEDURE — 97112 NEUROMUSCULAR REEDUCATION: CPT

## 2025-05-30 PROCEDURE — 97110 THERAPEUTIC EXERCISES: CPT

## 2025-05-30 NOTE — PROGRESS NOTES
"Daily Note     Today's date: 2025  Patient name: Deepa Darling  : 1995  MRN: [id number]  Referring provider: Bernadine Earl MD  Dx:   Encounter Diagnosis     ICD-10-CM    1. Acute bilateral low back pain with left-sided sciatica  M54.42           Start Time: 1518  Stop Time: 1558  Total time in clinic (min): 40 minutes    Subjective: Overall, low back pain has been improving but nerve pain traveling down the LLE>RLE has been increasing. Reports that her pain goes away when she is with friends and she is laughing.       Objective: See treatment diary below      Assessment: Pain worsened with flexion-rotation and no change with extension in standing. Pain abolished in low back and B glutes with repeated extension in lying with clinician OP to the upper lumbar spine and lower thoracic spine. Followed up with core activation exercises. Supine core activation exercises were tolerated well, standing exercise aggravated low back and glute symptoms. Followed this up with REIL w/ clinician OP, which reduced symptoms, and sustained lumbar extension, which abolished symptoms. Updated HEP to include REIL w/ overpressure and dead bug (legs only). All treatments tolerated well, no adverse response.      Plan: Continue per plan of care.      Precautions: none  Manuals   52 5 520      L ant rotation MET correction, LH         TPR L posterior hip, LH                        Neuro Re-Ed         Hip adductor isometric   15x10\"      Dead bug Legs only  3x10  HEP                                            Edu         Ther Ex         REIL W/ clinician OP to upper lumbar/lower thoracic 5x10 (abolish)    x15    W/ clinician OP 3x10 (better)    Lock and sag x10    Lock and sag w/ hands more superior x10 W/ clinician OP 4x10    W/ belt fixation 4x10   Sustained lumbar extension X3 min (abolish)        DEBBIE W/ belt x10 (STOP)        Repeated lumbar flexion   15x Sitting x15 (no effect)    [id number] (no " "effect)     Lumbar SG    R SG 2x10 (worse)    L SG 2x15 (better)     LTR Knees fall right (worse)   Knees fall left x15 (peripheralize)        Standing Pball press down 2x10x5\"      Glute bridge     2x10       L SKTC 10x10\"         L piriformis st 10x10\"               Edu    HEP     Ther Activity                           Gait Training                           Modalities                                    "

## 2025-05-30 NOTE — HOME EXERCISE EDUCATION
Program_ID:[id number]   Access Code: [email or web address]  URL: [email or web address]  Date: 05-  Prepared By: Joe Velasquez    Program Notes      Exercises      - Lumbar Side [place] at Wall - 5-8 x daily -  x weekly -  sets - 10-15 reps      - Prone Press Up - 5 x daily -  x weekly -  sets - 10-15 reps      - Dead Bug with Swiss Ball - 1-2 x daily -  x weekly - 3 sets - 10 reps

## 2025-05-30 NOTE — HOME EXERCISE EDUCATION
Program_ID:[id number]   Access Code: [email or web address]  URL: [email or web address]  Date: 05-  Prepared By: Joe Velasquez    Program Notes      Exercises      - Prone Press Up - 5 x daily -  x weekly -  sets - 10-15 reps      - Dead Bug with Swiss Ball - 1-2 x daily -  x weekly - 3 sets - 10 reps

## 2025-06-03 ENCOUNTER — OFFICE VISIT (OUTPATIENT)
Dept: PHYSICAL THERAPY | Facility: CLINIC | Age: 30
End: 2025-06-03
Attending: FAMILY MEDICINE
Payer: COMMERCIAL

## 2025-06-03 DIAGNOSIS — M54.42 ACUTE BILATERAL LOW BACK PAIN WITH LEFT-SIDED SCIATICA: Primary | ICD-10-CM

## 2025-06-03 PROCEDURE — 97112 NEUROMUSCULAR REEDUCATION: CPT

## 2025-06-03 PROCEDURE — 97110 THERAPEUTIC EXERCISES: CPT

## 2025-06-03 NOTE — PROGRESS NOTES
Daily Note     Today's date: 6/3/2025  Patient name: Deepa Darling  : 1995  MRN: [id number]  Referring provider: Bernadine Earl MD  Dx:   Encounter Diagnosis     ICD-10-CM    1. Acute bilateral low back pain with left-sided sciatica  M54.42             Start Time: 1601  Stop Time: 1629  Total time in clinic (min): 28 minutes    Subjective: Overall, low back pain has been improving but burning in the thigh is worsening. Her pain earlier today was 8/10 so she rested and it has decreased now.       Objective: See treatment diary below      Assessment: Significant improvement in low back pain and thigh burning with lower thoracic spine extension over foam roller in supine. Followed up with more core and hip strengthening. Provided blue tband for clamshell exercise at home. Encouraged pt to get a foam roller for thoracic spine mobility exercise. All treatments tolerated well, no adverse response.      Plan: Continue per plan of care.      Precautions: none  Manuals 5/30 6/3  5/6 5/13 5/20                                       Neuro Re-Ed         Hip adductor isometric         Dead bug Legs only  3x10  HEP        Clamshell  Tband (green) x15    Tband (blue) 2x15    W/ emphasis on core activation                                  Edu         Ther Ex         REIL W/ clinician OP to upper lumbar/lower thoracic 5x10 (abolish)    x15    W/ clinician OP 3x10 (better)    Lock and sag x10    Lock and sag w/ hands more superior x10 W/ clinician OP 4x10    W/ belt fixation 4x10   Sustained lumbar extension X3 min (abolish)        DEBBIE W/ belt x10 (STOP)        Repeated lumbar flexion    Sitting x15 (no effect)    [id number] (no effect)     Lumbar SG    R SG 2x10 (worse)    L SG 2x15 (better)     LTR Knees fall right (worse)   Knees fall left x15 (peripheralize)     Repeated thoracic extension  Supine, foam roller  5x10       Prone on elbows  2x4min  W/ clinician OP to thoracic spine       Glute bridge     2x10                                Edu    HEP     Ther Activity                           Gait Training                           Modalities

## 2025-06-03 NOTE — HOME EXERCISE EDUCATION
Program_ID:[id number]   Access Code: [email or web address]  URL: [email or web address]  Date: 06-  Prepared By: Joe Velasquez    Program Notes      Exercises      - Prone Press Up - 5 x daily -  x weekly -  sets - 10-15 reps      - Dead Bug with Swiss Ball - 1-2 x daily -  x weekly - 3 sets - 10 reps      - Thoracic Extension Mobilization on Foam Roll - 5 x daily -  x weekly - 1-2 sets - 10-15 reps

## 2025-06-04 ENCOUNTER — CONSULT (OUTPATIENT)
Dept: PAIN MEDICINE | Facility: CLINIC | Age: 30
End: 2025-06-04
Attending: CHIROPRACTOR
Payer: COMMERCIAL

## 2025-06-04 ENCOUNTER — HOSPITAL ENCOUNTER (OUTPATIENT)
Dept: RADIOLOGY | Facility: HOSPITAL | Age: 30
Discharge: HOME | End: 2025-06-04
Payer: COMMERCIAL

## 2025-06-04 VITALS — WEIGHT: 158 LBS | HEIGHT: 64 IN | BODY MASS INDEX: 26.98 KG/M2

## 2025-06-04 DIAGNOSIS — M53.3 PAIN OF LEFT SACROILIAC JOINT: Primary | ICD-10-CM

## 2025-06-04 DIAGNOSIS — M54.9 MID BACK PAIN: ICD-10-CM

## 2025-06-04 PROCEDURE — 99243 OFF/OP CNSLTJ NEW/EST LOW 30: CPT | Performed by: PAIN MEDICINE

## 2025-06-04 PROCEDURE — 72072 X-RAY EXAM THORAC SPINE 3VWS: CPT

## 2025-06-04 NOTE — PROGRESS NOTES
Name: Deepa Darling      : 1995      MRN: [id number]  Encounter Provider: Dong Dia MD  Encounter Date: 2025   Encounter department: [facility]  :  Assessment & Plan  Pain of left sacroiliac joint  Amirah is a pleasant 30-year-old female history of low back left buttocks pain in the distribution of the SI joint with 3 provocative test on exam we will recommend a left SI joint injection and continue with therapy to focus on core strengthening and buttock strengthening.  Start diclofenac 50 mg bid  Orders:  •  diclofenac sodium (VOLTAREN) 50 mg EC tablet; Take 1 tablet (50 mg total) by mouth 2 (two) times a day  •  FL spine and pain procedure; Future    Mid back pain  Mid back pain and low back pain may be more muscular in nature given her lack of core strength and gluteus muscle strength bilaterally.  Will recommend she focus on therapy to strengthen these areas we will obtain a mid thoracic x-ray to evaluate further.  Orders:  •  diclofenac sodium (VOLTAREN) 50 mg EC tablet; Take 1 tablet (50 mg total) by mouth 2 (two) times a day  •  XR spine thoracic 3 vw; Future            My impressions and treatment recommendations were discussed in detail with the patient who verbalized understanding and had no further questions.  Discharge instructions were provided. I personally saw and examined the patient and I agree with the above discussed plan of care.    History of Present Illness     Deepa Darling is a 30 y.o. female with chief complaint of left buttocks pain that radiates from her buttocks to her posterior thigh and laterally ongoing for the past 4 months increasing severity currently in therapy and chiropractic care which is mildly helping.  She is a  here and bends forward and backwards and feels pain in the left buttocks.  She also pain complains of pain in the midthoracic region with muscle spasms in the lower back  "and cramping.  She completed an MRI of her lumbar spine here to review pain can increase to 7 out of 10 depending on activity level.    Review of Systems   Musculoskeletal:  Positive for back pain.     Medical History Reviewed by provider this encounter:     .       Objective   Ht 5' 4\" (1.626 m)   Wt 71.7 kg (158 lb)   LMP 04/20/2025 (Approximate)   BMI 27.12 kg/m²         Physical Exam  Lumbar Spine:  No masses or atrophy,    Range of motion - Decreased extension/flexion  Palpation - no Tenderness to palpation in the lumbar parapsinals   PSIS tenderness positive left  Nba's/SYLVIA positive left  Yeoman + left  Gaenslen's + left  SLR neg       Strength Right Left   Hip flexion L1,2 5 5   Knee extension L3 5 5   Ankle dorsiflexion L4 5 5   Great toe extension L5 5 5   Ankle Plantarflexion S1 5 5       Sensory Exam:  intact to light touch bilateral LE       Reflexes:     Right Left   Biceps 2+ 2+   Triceps 2+ 2+   Brachioradialis 2+ 2+   Patellar 2+ 2+   Achilles 2+ 2+   Babinski neg neg        Gait normal    MRI L spine   normal                        "

## 2025-06-05 ENCOUNTER — OFFICE VISIT (OUTPATIENT)
Dept: PHYSICAL THERAPY | Facility: CLINIC | Age: 30
End: 2025-06-05
Attending: FAMILY MEDICINE
Payer: COMMERCIAL

## 2025-06-05 ENCOUNTER — HOSPITAL ENCOUNTER (OUTPATIENT)
Dept: VASCULAR ULTRASOUND | Facility: HOSPITAL | Age: 30
Discharge: HOME | End: 2025-06-05
Attending: NURSE PRACTITIONER
Payer: COMMERCIAL

## 2025-06-05 DIAGNOSIS — I83.813 VARICOSE VEINS OF BOTH LOWER EXTREMITIES WITH PAIN: ICD-10-CM

## 2025-06-05 DIAGNOSIS — M54.42 ACUTE BILATERAL LOW BACK PAIN WITH LEFT-SIDED SCIATICA: Primary | ICD-10-CM

## 2025-06-05 PROCEDURE — 93970 EXTREMITY STUDY: CPT | Performed by: SURGERY

## 2025-06-05 PROCEDURE — 97112 NEUROMUSCULAR REEDUCATION: CPT

## 2025-06-05 PROCEDURE — 93970 EXTREMITY STUDY: CPT

## 2025-06-05 NOTE — PROGRESS NOTES
Daily Note     Today's date: 2025  Patient name: Deepa Darling  : 1995  MRN: [id number]  Referring provider: Bernadine Earl MD  Dx:   Encounter Diagnosis     ICD-10-CM    1. Acute bilateral low back pain with left-sided sciatica  M54.42               Start Time: 1433  Stop Time: 1503  Total time in clinic (min): 30 minutes    Subjective: Pt reports that her low back and leg felt good for approximately one day after last appointment. She is still in the process of getting a foam roller so she can perform the exercise at home.       Objective: See treatment diary below      Assessment: Symptoms continue to resolve with repeated lower thoracic/upper lumbar extension on foam roller. Followed up again with core stabilization exercise. All treatments tolerated well, no adverse response.      Plan: Continue per plan of care.      Precautions: none  Manuals 5/30 6/3 6/5  5/13 5/20                                       Neuro Re-Ed         Hip adductor isometric         Dead bug Legs only  3x10  HEP  Legs only 3x15      Clamshell  Tband (green) x15    Tband (blue) 2x15    W/ emphasis on core activation                                  Edu         Ther Ex         REIL W/ clinician OP to upper lumbar/lower thoracic 5x10 (abolish)    x15    W/ clinician OP 3x10 (better)    Lock and sag x10    Lock and sag w/ hands more superior x10 W/ clinician OP 4x10    W/ belt fixation 4x10   Sustained lumbar extension X3 min (abolish)        DEBBIE W/ belt x10 (STOP)        Repeated lumbar flexion         Lumbar SG         LTR Knees fall right (worse)        Repeated thoracic extension  Supine, foam roller  5x10 Supine foam roller 4x15      Prone on elbows  2x4min  W/ clinician OP to thoracic spine 2x2min w/ clinician OP to lower thoracic spine      Glute bridge     2x10                               Edu   HEP      Ther Activity                           Gait Training                           Modalities

## 2025-06-08 DIAGNOSIS — M54.42 ACUTE BILATERAL LOW BACK PAIN WITH LEFT-SIDED SCIATICA: ICD-10-CM

## 2025-06-09 ENCOUNTER — OFFICE VISIT (OUTPATIENT)
Dept: PHYSICAL THERAPY | Facility: CLINIC | Age: 30
End: 2025-06-09
Attending: FAMILY MEDICINE
Payer: COMMERCIAL

## 2025-06-09 DIAGNOSIS — M54.42 ACUTE BILATERAL LOW BACK PAIN WITH LEFT-SIDED SCIATICA: Primary | ICD-10-CM

## 2025-06-09 PROCEDURE — 97110 THERAPEUTIC EXERCISES: CPT

## 2025-06-09 RX ORDER — METHOCARBAMOL 500 MG/1
500 TABLET, FILM COATED ORAL
Qty: 30 TABLET | Refills: 0 | Status: SHIPPED | OUTPATIENT
Start: 2025-06-09

## 2025-06-09 NOTE — PROGRESS NOTES
Daily Note     Today's date: 2025  Patient name: Deepa Darling  : 1995  MRN: [id number]  Referring provider: Bernadine Earl MD  Dx:   Encounter Diagnosis     ICD-10-CM    1. Acute bilateral low back pain with left-sided sciatica  M54.42           Start Time: 1501  Stop Time: 1531  Total time in clinic (min): 30 minutes    Subjective: Pt reports that her low back and leg continue to feel good after she performs upper lumbar/lower thoracic extension mobility exercises. However, the relief in pain does not last for a long time.      Objective: See treatment diary below      Assessment: Continued positive response with repeated lower thoracic/upper lumbar extension on foam roller. Trialed repeated extension in prone lying with sustained left side glide; this further decreased symptoms in back and L thigh. Added leg press, which challenged pt appropriately. All treatments tolerated well, no adverse response.      Plan: Continue per plan of care.      Precautions: none  Manuals 5/30 6/3 6/5 6/9  5/20                                       Neuro Re-Ed         Hip adductor isometric         Dead bug Legs only  3x10  HEP  Legs only 3x15      Clamshell  Tband (green) x15    Tband (blue) 2x15    W/ emphasis on core activation                                  Edu         Ther Ex         REIL W/ clinician OP to upper lumbar/lower thoracic 5x10 (abolish)   W/ clinician OP to upper lumbar/lower thoracic 3x10     W/ sustained L SG  2x10  W/ clinician OP 4x10    W/ belt fixation 4x10   Sustained lumbar extension X3 min (abolish)        DEBBIE W/ belt x10 (STOP)        Repeated lumbar flexion         Lumbar SG         LTR Knees fall right (worse)        Repeated thoracic extension  Supine, foam roller  5x10 Supine foam roller 4x15 Supine foam roller 4x15     Prone on elbows  2x4min  W/ clinician OP to thoracic spine 2x2min w/ clinician OP to lower thoracic spine 2x2min w/ clinician OP to lower thoracic spine      Glute bridge         Leg press    120# 3x15                       Edu   HEP      Ther Activity                           Gait Training                           Modalities

## 2025-06-12 ENCOUNTER — RESULTS FOLLOW-UP (OUTPATIENT)
Dept: VASCULAR SURGERY | Facility: CLINIC | Age: 30
End: 2025-06-12

## 2025-06-12 ENCOUNTER — OFFICE VISIT (OUTPATIENT)
Dept: PHYSICAL THERAPY | Facility: CLINIC | Age: 30
End: 2025-06-12
Attending: FAMILY MEDICINE
Payer: COMMERCIAL

## 2025-06-12 DIAGNOSIS — M54.42 ACUTE BILATERAL LOW BACK PAIN WITH LEFT-SIDED SCIATICA: Primary | ICD-10-CM

## 2025-06-12 PROCEDURE — 97110 THERAPEUTIC EXERCISES: CPT

## 2025-06-12 NOTE — PROGRESS NOTES
Daily Note     Today's date: 2025  Patient name: Deepa Darling  : 1995  MRN: [id number]  Referring provider: Bernadine Earl MD  Dx:   Encounter Diagnosis     ICD-10-CM    1. Acute bilateral low back pain with left-sided sciatica  M54.42             Start Time: 1506  Stop Time: 1531  Total time in clinic (min): 25 minutes    Subjective: Pt reports that her low back and leg felt good for two days after previous appointment and pain levels overall are decreasing. Reports that she is still afraid that with a quick movement, the pain will re-aggravate.      Objective: See treatment diary below      Assessment: Continued positive response with repeated lower thoracic/upper lumbar extension on foam roller and repeated extension in prone lying with clinician overpressure. Progressed load on leg press, which challenged pt appropriately. All treatments tolerated well, no adverse response.      Plan: Continue per plan of care.      Precautions: none  Manuals 5/30 6/3 6/5 6/9 6/12                                        Neuro Re-Ed         Hip adductor isometric         Dead bug Legs only  3x10  HEP  Legs only 3x15      Clamshell  Tband (green) x15    Tband (blue) 2x15    W/ emphasis on core activation                                  Edu         Ther Ex         REIL W/ clinician OP to upper lumbar/lower thoracic 5x10 (abolish)   W/ clinician OP to upper lumbar/lower thoracic 3x10     W/ sustained L SG  2x10 W/ clinician OP to upper lumbar/lower thoracic 2x10     Sustained lumbar extension X3 min (abolish)        DEBBIE W/ belt x10 (STOP)        Repeated lumbar flexion         Lumbar SG         LTR Knees fall right (worse)        Repeated thoracic extension  Supine, foam roller  5x10 Supine foam roller 4x15 Supine foam roller 4x15 Supine foam roller 3x15    Prone on elbows  2x4min  W/ clinician OP to thoracic spine 2x2min w/ clinician OP to lower thoracic spine 2x2min w/ clinician OP to lower thoracic  spine     Glute bridge         Leg press    120# 3x15 120# x15  130# 2x15                      Edu   HEP      Ther Activity                           Gait Training                           Modalities

## 2025-06-16 ENCOUNTER — OFFICE VISIT (OUTPATIENT)
Dept: PHYSICAL THERAPY | Facility: CLINIC | Age: 30
End: 2025-06-16
Attending: FAMILY MEDICINE
Payer: COMMERCIAL

## 2025-06-16 DIAGNOSIS — M54.42 ACUTE BILATERAL LOW BACK PAIN WITH LEFT-SIDED SCIATICA: Primary | ICD-10-CM

## 2025-06-16 PROCEDURE — 97110 THERAPEUTIC EXERCISES: CPT

## 2025-06-16 NOTE — PROGRESS NOTES
Daily Note     Today's date: 2025  Patient name: Deepa Darling  : 1995  MRN: [id number]  Referring provider: Bernadine Earl MD  Dx:   Encounter Diagnosis     ICD-10-CM    1. Acute bilateral low back pain with left-sided sciatica  M54.42           Start Time: 1510  Stop Time: 1534  Total time in clinic (min): 24 minutes    Subjective: Pt reports that her low back and thigh symptoms continue to decrease; she is able to walk more quickly more comfortably.      Objective: See treatment diary below      Assessment: Continued positive response with repeated lower thoracic/upper lumbar extension on foam roller and repeated extension in prone lying with clinician overpressure. Progressed load on leg press (with lumbar roll), which challenged pt appropriately and further reduced symptoms. All treatments tolerated well, no adverse response.      Plan: Continue per plan of care.      Precautions: none  Manuals 5/30 6/3 6/5 6/9 6/12 6/16                                       Neuro Re-Ed         Hip adductor isometric         Dead bug Legs only  3x10  HEP  Legs only 3x15      Clamshell  Tband (green) x15    Tband (blue) 2x15    W/ emphasis on core activation                                  Edu         Ther Ex         REIL W/ clinician OP to upper lumbar/lower thoracic 5x10 (abolish)   W/ clinician OP to upper lumbar/lower thoracic 3x10     W/ sustained L SG  2x10 W/ clinician OP to upper lumbar/lower thoracic 2x10  W/ clinician OP to upper lumbar/lower thoracic 2x10    Sustained lumbar extension X3 min (abolish)        DEBBIE W/ belt x10 (STOP)        Repeated lumbar flexion         Lumbar SG         LTR Knees fall right (worse)        Repeated thoracic extension  Supine, foam roller  5x10 Supine foam roller 4x15 Supine foam roller 4x15 Supine foam roller 3x15 Supine foam roller 3x15   Prone on elbows  2x4min  W/ clinician OP to thoracic spine 2x2min w/ clinician OP to lower thoracic spine 2x2min w/  clinician OP to lower thoracic spine     Glute bridge         Leg press    120# 3x15 120# x15  130# 2x15 120# x15  130# x15  140# x15                     Edu   HEP      Ther Activity                           Gait Training                           Modalities

## 2025-06-17 DIAGNOSIS — L30.9 ECZEMA, UNSPECIFIED TYPE: ICD-10-CM

## 2025-06-17 RX ORDER — TRIAMCINOLONE ACETONIDE 5 MG/G
CREAM TOPICAL 2 TIMES DAILY
Qty: 30 G | Refills: 0 | Status: SHIPPED | OUTPATIENT
Start: 2025-06-17

## 2025-06-19 ENCOUNTER — APPOINTMENT (OUTPATIENT)
Dept: PHYSICAL THERAPY | Facility: CLINIC | Age: 30
End: 2025-06-19
Attending: FAMILY MEDICINE
Payer: COMMERCIAL

## 2025-06-20 ENCOUNTER — HOSPITAL ENCOUNTER (OUTPATIENT)
Dept: RADIOLOGY | Facility: HOSPITAL | Age: 30
Discharge: HOME | End: 2025-06-20
Attending: PAIN MEDICINE
Payer: COMMERCIAL

## 2025-06-20 VITALS
SYSTOLIC BLOOD PRESSURE: 122 MMHG | RESPIRATION RATE: 18 BRPM | TEMPERATURE: 97.9 F | OXYGEN SATURATION: 95 % | HEART RATE: 89 BPM | DIASTOLIC BLOOD PRESSURE: 67 MMHG

## 2025-06-20 DIAGNOSIS — M53.3 PAIN OF LEFT SACROILIAC JOINT: ICD-10-CM

## 2025-06-20 PROCEDURE — 27096 INJECT SACROILIAC JOINT: CPT | Performed by: PAIN MEDICINE

## 2025-06-20 RX ORDER — BUPIVACAINE HCL/PF 2.5 MG/ML
3 VIAL (ML) INJECTION ONCE
Status: COMPLETED | OUTPATIENT
Start: 2025-06-20 | End: 2025-06-20

## 2025-06-20 RX ORDER — METHYLPREDNISOLONE ACETATE 80 MG/ML
80 INJECTION, SUSPENSION INTRA-ARTICULAR; INTRALESIONAL; INTRAMUSCULAR; PARENTERAL; SOFT TISSUE ONCE
Status: COMPLETED | OUTPATIENT
Start: 2025-06-20 | End: 2025-06-20

## 2025-06-20 RX ADMIN — BUPIVACAINE HYDROCHLORIDE 3 ML: 2.5 INJECTION, SOLUTION EPIDURAL; INFILTRATION; INTRACAUDAL at 14:41

## 2025-06-20 RX ADMIN — IOHEXOL 1 ML: 300 INJECTION, SOLUTION INTRAVENOUS at 14:41

## 2025-06-20 RX ADMIN — METHYLPREDNISOLONE ACETATE 80 MG: 80 INJECTION, SUSPENSION INTRA-ARTICULAR; INTRALESIONAL; INTRAMUSCULAR; SOFT TISSUE at 14:41

## 2025-06-20 NOTE — H&P
History of Present Illness: The patient is a 30 y.o. female who presents with complaints of left buttocks pain    Past Medical History[1]    Past Surgical History[2]    Current Medications[3]    Allergies[4]    Physical Exam: There were no vitals filed for this visit.  General: Awake, Alert, Oriented x 3, Mood and affect appropriate  Respiratory: Respirations even and unlabored  Cardiovascular: Peripheral pulses intact; no edema  Musculoskeletal Exam: TTP low back and buttocks left side    ASA Score: 2         Assessment:   1. Pain of left sacroiliac joint        Plan: left si joint injection         [1]   Past Medical History:  Diagnosis Date    Hypothyroidism    [2]   Past Surgical History:  Procedure Laterality Date    WISDOM TOOTH EXTRACTION     [3]   Current Outpatient Medications:     diclofenac sodium (VOLTAREN) 50 mg EC tablet, Take 1 tablet (50 mg total) by mouth 2 (two) times a day, Disp: 60 tablet, Rfl: 1    gabapentin (Neurontin) 100 mg capsule, Take 1 capsule (100 mg total) by mouth 3 (three) times a day, Disp: 90 capsule, Rfl: 3    levonorgestrel (Liletta, 52 MG,) 20.1 mcg/day IUD, 1 each by Intrauterine route Once every 8 years, Disp: , Rfl:     meloxicam (MOBIC) 15 mg tablet, Take 1 tablet (15 mg total) by mouth daily as needed for moderate pain, Disp: 90 tablet, Rfl: 1    methocarbamol (ROBAXIN) 500 mg tablet, TAKE 1 TABLET (500 MG TOTAL) BY MOUTH DAILY AT BEDTIME, Disp: 30 tablet, Rfl: 0    tirzepatide (Zepbound) 5 mg/0.5 mL auto-injector, Inject 0.5 mL (5 mg total) under the skin once a week, Disp: 2 mL, Rfl: 2    triamcinolone (KENALOG) 0.5 % cream, Apply topically 2 (two) times a day, Disp: 30 g, Rfl: 0  [4]   Allergies  Allergen Reactions    Iodine - Food Allergy Hives

## 2025-06-20 NOTE — DISCHARGE INSTR - LAB

## 2025-06-22 NOTE — PROGRESS NOTES
PT Re-Evaluation     Today's date: 2025  Patient name: Deepa Darling  : 1995  MRN: [id number]  Referring provider: Bernadine Earl MD  Dx:   Encounter Diagnosis     ICD-10-CM    1. Acute bilateral low back pain with left-sided sciatica  M54.42       2. Decreased range of motion of thoracic spine  M53.84             Start Time: 1722  Stop Time: 1800  Total time in clinic (min): 38 minutes    Assessment  Impairments: abnormal or restricted ROM, activity intolerance, pain with function, participation limitations, activity limitations and endurance    Assessment details: Problem List:  1) Lumbar spine hypomobility  2) Core and hip muscle strength impairment    Deepa Darling is a pleasant 30 y.o. female who presents with L>R low back pain with radiculopathy. She has lumbar spine hypomobility and core/hip muscle strength impairment resulting low back pain with radicular symptoms, causing pain and difficulty with activities that involve forward bending, lifting, standing for prolonged periods of time, and walking. Repeated motions examination demonstrates upper lumbar/lower thoracic spine  hypomobility without adaptive tissue shortening, with directional preference into extension, as pain abolished with ambulation and forward bending. Sciatic nerve tension also decreased. I expect she will continue to improve with lumbar and thoracic spine extension exercise progression and core/hip strengthening exercises.        Comparable signs:  1) Standing for >20 minutes  2) Resisted knee flexion and extension  3) Walking long distances    Goals  Short term (1-3 weeks)  1. Pt will perform prescribed HEP independently. -- met 100%  2. Pt will be able to manage symptoms independently. -- met 100%  Long term (12 weeks)  1. Pt will report ability to stand and walk for >20 minutes. -- met 100%  2. Pt will report ability to perform full work duties without increase in low back and hip pain. -- progressing  40%  3. Pt will demo ability to lift 30# weight from the floor without increase in low back and hip pain. -- progressing 40%    Plan  Patient would benefit from: skilled physical therapy and home program  Planned modality interventions: biofeedback, thermotherapy: hydrocollator packs and cryotherapy    Planned therapy interventions: abdominal trunk stabilization, joint mobilization, manual therapy, neuromuscular re-education, strengthening, flexibility, stretching, therapeutic activities, functional ROM exercises, therapeutic exercise and home exercise program    Frequency: 1-2x week  Duration in weeks: 8  Plan of Care beginning date: 2025  Plan of Care expiration date: 2025  Treatment plan discussed with: patient        Subjective Evaluation    History of Present Illness  Mechanism of injury: Pt reports that overall, her pain has been improving still. The pain in the midline low back and across the low back has resolved, but there is still pain in the posterior and lateral L>R hip. She also reports feeling pressure in the posterior L thigh. She reports that over the weekend, she lifted a very heavy bag and twisted her back; this caused a crack and severe pain in the posteromedial L hip, which reduced within hours. She starts back at work tomorrow night. Reports that she received a foam roller and has been performing repeated lower thoracic extension for HEP. She reports approximately 60% improvement since starting PT.     Goals: Tennis, soccer, work      Pain  Current pain ratin  At best pain ratin  At worst pain ratin          Objective      Dermatomes R L   L1 WNL WNL   L2 WNL WNL   L3 WNL WNL   L4 WNL WNL   L5 WNL WNL   S1 WNL WNL   S2 WNL WNL      MMT R L   Hip flexion 5 5   Hip extension     Hip abduction     Hip adduction     Hip ER     Hip IR     Knee flexion 5 5   Knee extension 5 5   Ankle DF 5 5   Ankle PF 5 5   Ankle inversion     Ankle eversion       AROM R L   Hip flexion WNL WNL    Hip extension     Hip abduction     Hip adduction     Hip ER WNL WNL   Hip IR WNL WNL   Knee flexion WNL WNL   Knee extension WNL WNL   Ankle DF WNL WNL   Ankle PF WNL WNL   Ankle inversion     Ankle eversion       AROM R L   Lumbar flexion WNL   Lumbar extension Limited 10%   Lumbar side glide WNL WNL     Thoracic spine extension: limited 25%    Slump: (+) R, (-) L    Repeated movements examination Reponse   REIL w/ clinician OP to lower thoracic spine No effect, abolish           Precautions: none  Manuals 6/23 6/5 6/9 6/12 6/16                                       Neuro Re-Ed         Hip adductor isometric         Dead bug   Legs only 3x15      [facility]                                    Edu         Ther Ex         REIL W/ clinician OP to upper lumbar/lower thoracic 2x10    W/ clinician OP to upper lumbar/lower thoracic 3x10     W/ sustained L SG  2x10 W/ clinician OP to upper lumbar/lower thoracic 2x10  W/ clinician OP to upper lumbar/lower thoracic 2x10    Sustained lumbar extension         DEBBIE         Repeated lumbar flexion         Lumbar SG         LTR         Repeated thoracic extension Supine foam roller 3x15  Supine foam roller 4x15 Supine foam roller 4x15 Supine foam roller 3x15 Supine foam roller 3x15   Prone on elbows   2x2min w/ clinician OP to lower thoracic spine 2x2min w/ clinician OP to lower thoracic spine     Glute bridge         Leg press 125# x15  135# x15  145# x15  155# x15   120# 3x15 120# x15  130# 2x15 120# x15  130# x15  140# x15                     Edu Examination findings, POC, HEP  HEP      Ther Activity                           Gait Training                           Modalities

## 2025-06-23 ENCOUNTER — EVALUATION (OUTPATIENT)
Dept: PHYSICAL THERAPY | Facility: CLINIC | Age: 30
End: 2025-06-23
Attending: FAMILY MEDICINE
Payer: COMMERCIAL

## 2025-06-23 DIAGNOSIS — M54.42 ACUTE BILATERAL LOW BACK PAIN WITH LEFT-SIDED SCIATICA: Primary | ICD-10-CM

## 2025-06-23 DIAGNOSIS — M53.84 DECREASED RANGE OF MOTION OF THORACIC SPINE: ICD-10-CM

## 2025-06-23 PROCEDURE — 97110 THERAPEUTIC EXERCISES: CPT

## 2025-06-26 ENCOUNTER — APPOINTMENT (OUTPATIENT)
Dept: PHYSICAL THERAPY | Facility: CLINIC | Age: 30
End: 2025-06-26
Attending: FAMILY MEDICINE
Payer: COMMERCIAL

## 2025-06-30 ENCOUNTER — OFFICE VISIT (OUTPATIENT)
Dept: PHYSICAL THERAPY | Facility: CLINIC | Age: 30
End: 2025-06-30
Attending: FAMILY MEDICINE
Payer: COMMERCIAL

## 2025-06-30 DIAGNOSIS — M54.42 ACUTE BILATERAL LOW BACK PAIN WITH LEFT-SIDED SCIATICA: Primary | ICD-10-CM

## 2025-06-30 DIAGNOSIS — M53.84 DECREASED RANGE OF MOTION OF THORACIC SPINE: ICD-10-CM

## 2025-06-30 PROCEDURE — 97110 THERAPEUTIC EXERCISES: CPT

## 2025-06-30 NOTE — PROGRESS NOTES
Daily Note     Today's date: 2025  Patient name: Deepa Darling  : 1995  MRN: [id number]  Referring provider: Bernadine Earl MD  Dx:   Encounter Diagnosis     ICD-10-CM    1. Acute bilateral low back pain with left-sided sciatica  M54.42       2. Decreased range of motion of thoracic spine  M53.84           Start Time: 1635  Stop Time: 1703  Total time in clinic (min): 28 minutes    Subjective: Pt reports that her pain continues to improve; she has some paresthesia in her L thigh and minor pain in her lower back right now.       Objective: See treatment diary below      Assessment: Pain with ambulation and at rest abolished with REIL w/ clinician OP. Continued with leg press and added squats for LE strengthening. Encouraged performance of squats for HEP. All treatments tolerated well, no adverse response.       Plan: Continue per plan of care.      Precautions: none  Manuals                                        Neuro Re-Ed         Hip adductor isometric         Dead bug         [place]                                    Edu         Ther Ex         REIL W/ clinician OP to upper lumbar/lower thoracic 2x10  W/ clinician OP to upper lumbar/lower thoracic 2x10   W/ clinician OP to upper lumbar/lower thoracic 3x10     W/ sustained L SG  2x10 W/ clinician OP to upper lumbar/lower thoracic 2x10  W/ clinician OP to upper lumbar/lower thoracic 2x10    Sustained lumbar extension         DEBBIE         Repeated lumbar flexion         Lumbar SG         LTR         Repeated thoracic extension Supine foam roller 3x15   Supine foam roller 4x15 Supine foam roller 3x15 Supine foam roller 3x15   Prone on elbows    2x2min w/ clinician OP to lower thoracic spine     Glute bridge         Leg press 125# x15  135# x15  145# x15  155# x15 135# x15  155# x15  165# x15  180# x15  120# 3x15 120# x15  130# 2x15 120# x15  130# x15  140# x15   Squat  X10  10# x10  20# x10       Recumb bike  5 min        Edu Examination findings, POC, HEP HEP       Ther Activity                           Gait Training                           Modalities

## 2025-07-03 ENCOUNTER — TELEPHONE (OUTPATIENT)
Dept: PAIN MEDICINE | Facility: MEDICAL CENTER | Age: 30
End: 2025-07-03

## 2025-07-03 NOTE — TELEPHONE ENCOUNTER
Patient reports 50% improvement post inj  Pain level 2-3/10   Patient stated she has more pain now on her right side than her left.

## 2025-07-04 DIAGNOSIS — E66.09 CLASS 1 OBESITY DUE TO EXCESS CALORIES WITH SERIOUS COMORBIDITY AND BODY MASS INDEX (BMI) OF 34.0 TO 34.9 IN ADULT: ICD-10-CM

## 2025-07-04 DIAGNOSIS — E66.811 CLASS 1 OBESITY DUE TO EXCESS CALORIES WITH SERIOUS COMORBIDITY AND BODY MASS INDEX (BMI) OF 34.0 TO 34.9 IN ADULT: ICD-10-CM

## 2025-07-07 RX ORDER — TIRZEPATIDE 5 MG/.5ML
5 INJECTION, SOLUTION SUBCUTANEOUS WEEKLY
Qty: 2 ML | Refills: 0 | Status: SHIPPED | OUTPATIENT
Start: 2025-07-07 | End: 2025-09-29

## 2025-07-11 ENCOUNTER — OFFICE VISIT (OUTPATIENT)
Dept: PAIN MEDICINE | Facility: CLINIC | Age: 30
End: 2025-07-11
Payer: COMMERCIAL

## 2025-07-11 VITALS — WEIGHT: 150 LBS | BODY MASS INDEX: 25.61 KG/M2 | HEIGHT: 64 IN

## 2025-07-11 DIAGNOSIS — M53.3 PAIN OF LEFT SACROILIAC JOINT: Primary | ICD-10-CM

## 2025-07-11 PROCEDURE — 99214 OFFICE O/P EST MOD 30 MIN: CPT | Performed by: NURSE PRACTITIONER

## 2025-07-11 NOTE — PATIENT INSTRUCTIONS
"Patient Education     Back exercises   The Basics   Written by the doctors and editors at [facility]   Why do I need to do back exercises? -- Back exercises can help ease back pain and might help prevent future back pain. Long term, it is important to strengthen the muscles in your lower back, buttocks, and belly. These are your \"core muscles.\" Stretching exercises are also important to keep your muscles flexible.  Below are some stretching and strengthening exercises that might help you. Other forms of movement can help ease or prevent back pain, too. For example, some people like to walk, do aerobic exercise, or do yoga or dominguez chi. The most important thing is to move your body. Your doctor, nurse, or physical therapist can help you find different types of activity that work for you.  What stretching exercises should I do? -- Below are some examples of stretching exercises. Warm up your muscles before stretching to help prevent injury. To warm up, you can walk, jog, or cycle for a few minutes.  Start by repeating each of these stretches 2 to 3 times. Try to hold each stretch for 5 to 10 seconds, and try to do the stretches 2 to 3 times each day. Breathe slowly and deeply as you do the exercises. Never bounce when doing stretches.   Cat-cow stretch (figure 1) - Start on all fours on the floor, with your hands under your shoulders, knees under your hips, and your back flat. First, tuck your chin and tighten your stomach muscles as you round your back (like a \"cat\"). Hold the stretch for about 10 seconds. Rest for a few seconds as you flatten your back. Next, lift your chin and let your belly and lower back sink toward the floor (like a \"cow\"). Hold the stretch for about 10 seconds.   Single knee-to-chest stretches (figure 2) ? While lying on your back, bend your knees with your feet flat on the floor. Pull 1 knee toward your chest until you feel a stretch in your lower back and buttock area. Lower, and repeat with the " other knee. If you have knee problems, pull your knee up by grabbing the back of your thigh instead of the front of your knee. You can also do this exercise by grabbing both knees at the same time.   Lower trunk rotations (figure 3) ? While lying on your back, bend your knees with your feet flat on the floor. Keep your knees and ankles together, and then drop them to 1 side. Keep both of your shoulders touching the floor until you feel a stretch in the muscles at the side of the back. Repeat on the other side.   Lower back stretches seated (figure 4) ? Sit in a chair with your feet spread about shoulder width apart. Then, lean forward until you feel a stretch in your lower back.  What strengthening exercises should I do? -- Below are some examples of strengthening exercises.  Start by doing each exercise 2 to 3 times. Work up to doing each exercise 10 times. Hold each exercise for 3 to 5 seconds, and try to do the exercises 2 to 3 times each day. Do all exercises slowly.   Shoulder blade squeezes (figure 5) ? Pinch your shoulder blades together on your upper back, and hold 3 to 5 seconds. You can also do these 1 side at a time. Sit with good posture, and make sure that your shoulders do not rise up when you do this exercise. Relax.   Pelvic tilts (figure 6) ? Lie on your back with your knees bent and feet flat on the floor. Tighten your stomach muscles, and press your lower back down to the floor. Relax. You should be able to breathe comfortably during this exercise.   Hip lifts (figure 7) ? Lie on your back with your knees bent and feet flat on the floor. Tighten your stomach muscles, keep your back flat, and lift your buttocks off of the floor. Relax. You should feel this in your buttocks, not in your lower back.  What else should I know?    Exercise, including stretching, might be slightly uncomfortable. But you should not have sharp or severe pain. If you do get severe pain, stop what you are doing. If severe  "pain continues, call your doctor or nurse.   Do not hold your breath when exercising. If you tend to hold your breath, try counting out loud when exercising. If any exercise bothers you, stop right away.   Always warm up before exercising. Warm muscles stretch much easier than cool muscles. Stretching cool muscles can lead to injury.   Doing exercises before a meal can be a good way to get into a routine.  All topics are updated as new evidence becomes available and our peer review process is complete.  This topic retrieved from [employer] on: Apr 03, 2024.  Topic 230993 Version 2.0  Release: 32.2.4 - C32.92  © 2024 [employer]Date, Inc. and/or its affiliates. All rights reserved.  figure 1: Cat-cow stretch     Start on all fours on the floor, with hands under your shoulders, knees under your hips, and your back flat. First, tuck your chin and tighten your stomach muscles as you round your back (like a \"cat\"). Hold the stretch for about 10 seconds. Rest for a few seconds as you flatten your back. Next, lift your chin and let your belly and lower back sink toward the floor (like a \"cow\"). Hold the stretch for about 10 seconds.  Graphic [id number] Version 1.0  figure 2: Single knee-to-chest stretch     Lie on your back, bend your knees, and have your feet flat on the floor. Pull 1 knee toward your chest until you feel a stretch in your lower back and buttock area. Repeat with the other knee. If you have knee problems, pull your knee up by grabbing the back of your thigh instead of the front of your knee. You can also do this exercise by grabbing both knees at the same time.  Graphic [id number] Version 1.0  figure 3: Lower trunk rotation     While lying on your back, bend your knees and have your feet flat on the floor. Keep your legs together and then drop them to 1 side. Keep both of your shoulders touching the floor until you feel a stretch in the muscles at the side of the back. Repeat on the other side.  Graphic [id number] Version " 1.0  figure 4: Lower back stretch     Sit in a chair with your feet spread about shoulder width apart. Then, lean forward until you feel a stretch in your lower back.  Graphic [id number] Version 1.0  figure 5: Shoulder blade squeezes     Pinch your shoulder blades together on your upper back and hold for 3 to 5 seconds. Make sure that you are sitting with good posture and that your shoulders do not raise up when you do this exercise. Relax.  Graphic [id number] Version 1.0  figure 6: Pelvic tilts     Lie on your back with your knees bent and feet flat on the floor. Tighten your stomach muscles and press your lower back down to the floor. Relax.  Graphic [id number] Version 1.0  figure 7: Hip lifts     Lie on your back with your knees bent and feet flat on the floor. Tighten your stomach muscles and lift your buttocks off of the floor. Relax.  Graphic [id number] Version 1.0  Consumer Information Use and Disclaimer   Disclaimer: This generalized information is a limited summary of diagnosis, treatment, and/or medication information. It is not meant to be comprehensive and should be used as a tool to help the user understand and/or assess potential diagnostic and treatment options. It does NOT include all information about conditions, treatments, medications, side effects, or risks that may apply to a specific patient. It is not intended to be medical advice or a substitute for the medical advice, diagnosis, or treatment of a health care provider based on the health care provider's examination and assessment of a patient's specific and unique circumstances. Patients must speak with a health care provider for complete information about their health, medical questions, and treatment options, including any risks or benefits regarding use of medications. This information does not endorse any treatments or medications as safe, effective, or approved for treating a specific patient. Up[employer], Inc[employer] and its affiliates disclaim any warranty or  liability relating to this information or the use thereof.The use of this information is governed by the Terms of Use, available at https://www.wolterskluwer.com/en/know/clinical-effectiveness-terms. 2024© Up[employer]Date, Inc. and its affiliates and/or licensors. All rights reserved.  Copyright   © 2024 [employer], Inc. and/or its affiliates. All rights reserved.  Patient Education     Core Strengthening Exercises on Back or on Hands and Knees   About this topic   Your core muscles are in your chest, back, buttock, and stomach area. They are your abdominal, back, and pelvis muscles. These muscles help keep your body stable when using your arms or legs. They also help with balance and posture. There are many exercises you can do to keep these muscles strong.  If you have back problems like a compression fracture or a ruptured disc, doing some of these exercises could make your problem worse. Some of these exercises may cause lower back pain.  General   Before starting with a program, ask your doctor if you are healthy enough to do these exercises. Your doctor may have you work with a , chiropractor, or physical therapist to make a safe exercise program to meet your needs.  Strengthening Exercises   Strengthening exercises keep your muscles firm and strong. Start by repeating each exercise 2 to 3 times. Work up to doing each exercise 10 times. Try to do the exercises 2 to 3 times each day. Hold each exercise for 3 to 5 seconds. Do all exercises slowly.  Hip lifts ? Lie on your back with your knees bent and feet flat on the floor. Tighten your stomach muscles and push your heels into the floor to lift your buttocks off the floor. Relax.  Pelvic tilts ? Lie on your back with your knees bent and feet flat on the floor. Tighten your stomach muscles and press your lower back down to the floor. Relax.  Straight leg raises lying down ? Lie on your back with one leg straight. Bend your other knee so the foot is flat on the  bed. Keeping your leg straight, lift the leg up to the level of your other knee. Lower it back down. Repeat with the other leg.  Knee flex lying down ? Lie on your back with both knees bent and your feet flat on the floor. Tighten your belly muscles. Raise one leg up and back down as if you are marching in slow motion. Keep belly muscles tight while you move your leg. Switch legs. To make this exercise harder, raise both arms straight up in the air. Tighten your belly muscles. When you raise one leg up, reach the opposite arm over your head. Switch, moving the opposite arm and leg until you have done 10 repetitions on each side.  Abdominal crunches ? Lie on your back with both knees bent. Keep your feet flat on the floor. Place your hands in one of these positions. Try starting with the first position since it is the easiest. As you get better, use the other positions to make it harder.  Crunches with arms at sides.  Crunches with arms across chest.  Crunches with arms behind head. Be careful not to interlock your fingers behind your neck or head while doing crunches. This may add tension to your neck and cause strain.  Look at the ceiling. Tighten your belly muscles and lift your shoulders and upper back off the floor. Breathe out while you are doing this. Lower your shoulders to the floor. Breathe in while you are doing this. Relax your belly muscles all the way before starting another crunch.  Arm and leg lifts on hands and knees ? Start on your hands and knees. With all of these exercises, keep your back as level as possible. If you are having trouble with this, you may want to put a small object on your back such as a book. If it falls off, you are not keeping your back level enough during the exercise.  Lift one arm up to shoulder level and hold. Lower it back down. Now, lift up the other arm and hold.  Lift one leg up and kick it straight out until it is in line with your back and hold. Lower it back down.  Now, lift up the other leg and hold.  Lift one arm and the OPPOSITE leg up at the same time and hold. Lower them down. Now, repeat using the other arm and leg. This is a very hard exercise. It may take time to be able to do this.               What will the results be?   Stronger core  Better balance  More toned belly and back muscles  Easier to do daily activities  Better sports performance  Less low back pain  Helpful tips   Stay active and work out to keep your muscles strong and flexible.  Keep a healthy weight to avoid putting too much stress on your spine. Eat a healthy diet to keep your muscles healthy.  Be sure you do not hold your breath when exercising. This can raise your blood pressure. If you tend to hold your breath, try counting out loud when exercising. If any exercise bothers you, stop right away.  Try walking or cycling at an easy pace for a few minutes to warm up your muscles. Do this again after exercising.  Exercise may be slightly uncomfortable, but you should not have sharp pains. If you do get sharp pains, stop what you are doing. If the sharp pains continue, call your doctor.  Last Reviewed Date   2021-03-18  Consumer Information Use and Disclaimer   This generalized information is a limited summary of diagnosis, treatment, and/or medication information. It is not meant to be comprehensive and should be used as a tool to help the user understand and/or assess potential diagnostic and treatment options. It does NOT include all information about conditions, treatments, medications, side effects, or risks that may apply to a specific patient. It is not intended to be medical advice or a substitute for the medical advice, diagnosis, or treatment of a health care provider based on the health care provider's examination and assessment of a patient’s specific and unique circumstances. Patients must speak with a health care provider for complete information about their health, medical questions, and  treatment options, including any risks or benefits regarding use of medications. This information does not endorse any treatments or medications as safe, effective, or approved for treating a specific patient. UpTo[employer], [employer] and its affiliates disclaim any warranty or liability relating to this information or the use thereof. The use of this information is governed by the Terms of Use, available at https://www.[employer].com/en/know/clinical-effectiveness-terms   Copyright   Copyright © 2024 Up[employer]Date, [employer]. and its affiliates and/or licensors. All rights reserved.

## 2025-07-11 NOTE — ASSESSMENT & PLAN NOTE
Patient reports good relief from her injection.  She has reported some occasional twinges here and there but improved spontaneously.  She does have a little bit of soreness and swelling still at the injection site, but I told her this should get better with time.    Continue physical therapy    I will provide her with some home exercises and stretches to do prior to starting work which may help decrease her pain flareups while working.    Follow-up as needed

## 2025-07-15 DIAGNOSIS — M54.42 ACUTE BILATERAL LOW BACK PAIN WITH LEFT-SIDED SCIATICA: ICD-10-CM

## 2025-07-16 RX ORDER — METHOCARBAMOL 500 MG/1
500 TABLET, FILM COATED ORAL
Qty: 30 TABLET | Refills: 0 | Status: SHIPPED | OUTPATIENT
Start: 2025-07-16

## 2025-07-24 ENCOUNTER — EVALUATION (OUTPATIENT)
Dept: PHYSICAL THERAPY | Facility: CLINIC | Age: 30
End: 2025-07-24
Attending: FAMILY MEDICINE
Payer: COMMERCIAL

## 2025-07-24 DIAGNOSIS — M53.84 DECREASED RANGE OF MOTION OF THORACIC SPINE: ICD-10-CM

## 2025-07-24 DIAGNOSIS — M54.42 ACUTE BILATERAL LOW BACK PAIN WITH LEFT-SIDED SCIATICA: Primary | ICD-10-CM

## 2025-07-24 PROCEDURE — 97112 NEUROMUSCULAR REEDUCATION: CPT

## 2025-07-24 NOTE — PROGRESS NOTES
PT Re-Evaluation     Today's date: 2025  Patient name: Deepa Darling  : 1995  MRN: [id number]  Referring provider: Bernadine Earl MD  Dx:   Encounter Diagnosis     ICD-10-CM    1. Acute bilateral low back pain with left-sided sciatica  M54.42       2. Decreased range of motion of thoracic spine  M53.84               Start Time: 1305  Stop Time: 1335  Total time in clinic (min): 30 minutes    Assessment  Impairments: activity intolerance, pain with function, participation limitations, activity limitations and endurance    Assessment details: Problem List:  1) Core muscle strength impairment    Deepa Darling is a pleasant 30 y.o. female who presents with L>R low back pain. She no longer has lumbar and thoracic spine hypomobility. She still has minor core muscle strength impairment resulting low back pain, causing pain and difficulty with activities that involve repetitive forward bending, lifting, and sitting for prolonged periods of time. Symptoms have improved significantly since focusing on core and hip strengthening exercises and I expect this to continue. Plan to follow up in 3 weeks, anticipate discharge.    Comparable signs:  1) Standing for >20 minutes  2) Resisted knee flexion and extension  3) Walking long distances    Goals  Short term (1-3 weeks)  1. Pt will perform prescribed HEP independently. -- met 100%  2. Pt will be able to manage symptoms independently. -- met 100%  Long term (12 weeks)  1. Pt will report ability to stand and walk for >20 minutes. -- met 100%  2. Pt will report ability to perform full work duties without increase in low back and hip pain. -- progressing 70%  3. Pt will demo ability to lift 30# weight from the floor without increase in low back and hip pain. -- progressing 70%    Plan  Patient would benefit from: skilled physical therapy and home program  Planned modality interventions: biofeedback, thermotherapy: hydrocollator packs and  cryotherapy    Planned therapy interventions: abdominal trunk stabilization, joint mobilization, manual therapy, neuromuscular re-education, strengthening, flexibility, stretching, therapeutic activities, functional ROM exercises, therapeutic exercise and home exercise program    Frequency: 1-2x week  Duration in weeks: 8  Plan of Care beginning date: 2025  Plan of Care expiration date: 2025  Treatment plan discussed with: patient      Subjective Evaluation    History of Present Illness  Mechanism of injury: Pain levels down overall, no more pain in the hips  She has been doing light weight lifting (squat, dead lift, bridge, dead bug, hip adduction) consistently and mobility exercise intermittently. She went to play tennis last week and felt good during and after. She was not fearful of movement while playing. There is still one area of pain near the LEFT PSIS.  She is able to do  everything she needs for work, lifts with her legs now  She still feels minor discomfort when driving still but very minor.     Goals: Tennis, soccer, work      Pain  Current pain ratin  At best pain ratin  At worst pain ratin        Objective    Flowsheet Rows      Flowsheet Row Most Recent Value   PT/OT G-Codes    Current Score 53   Projected Score 70          Dermatomes R L   L1 WNL WNL   L2 WNL WNL   L3 WNL WNL   L4 WNL WNL   L5 WNL WNL   S1 WNL WNL   S2 WNL WNL      MMT R L   Hip flexion 5 5   Hip extension     Hip abduction     Hip adduction     Hip ER     Hip IR     Knee flexion 5 5   Knee extension 5 5   Ankle DF 5 5   Ankle PF 5 5   Ankle inversion     Ankle eversion       AROM R L   Hip flexion WNL WNL   Hip extension     Hip abduction     Hip adduction     Hip ER WNL WNL   Hip IR WNL WNL   Knee flexion WNL WNL   Knee extension WNL WNL   Ankle DF WNL WNL   Ankle PF WNL WNL   Ankle inversion     Ankle eversion       AROM R L   Lumbar flexion WNL   Lumbar extension WNL   Lumbar side glide WNL WNL     Thoracic  spine extension: WNL    Slump: (-) R, (-) L              Insurance Billing Rule ReEval POC expires PT/OT + Visit Limit? Co-Insurance Misc   [employer] [facility] 8/21/25 9/18/25 [facility]OMN $20 Co-pay                                            Visit/Unit Tracking  Date 6/30 7/24                   Used 15 16                   Remaining                            Precautions: none  Manuals 6/23 6/30 7/24 6/12 6/16                                       Neuro Re-Ed         Hip adductor isometric         Dead bug   2x12 ea      Clamshell         Reverse hyperextensions   3x10      Pball crush   Standing on BOSU (flat side down)  [id number]               Edu         Ther Ex         REIL W/ clinician OP to upper lumbar/lower thoracic 2x10  W/ clinician OP to upper lumbar/lower thoracic 2x10    W/ clinician OP to upper lumbar/lower thoracic 2x10  W/ clinician OP to upper lumbar/lower thoracic 2x10    Repeated thoracic extension Supine foam roller 3x15    Supine foam roller 3x15 Supine foam roller 3x15   Prone on elbows         Glute bridge         Leg press 125# x15  135# x15  145# x15  155# x15 135# x15  155# x15  165# x15  180# x15   120# x15  130# 2x15 120# x15  130# x15  140# x15   Squat  X10  10# x10  20# x10       Recumb bike  5 min       Row   X10, x20 (blue)  X20 (black)      Edu Examination findings, POC, HEP HEP POC, HEP      Ther Activity                           Gait Training                           Modalities

## 2025-07-30 DIAGNOSIS — M53.3 PAIN OF LEFT SACROILIAC JOINT: ICD-10-CM

## 2025-07-30 DIAGNOSIS — M54.9 MID BACK PAIN: ICD-10-CM

## 2025-08-14 ENCOUNTER — OFFICE VISIT (OUTPATIENT)
Dept: PHYSICAL THERAPY | Facility: CLINIC | Age: 30
End: 2025-08-14
Attending: FAMILY MEDICINE
Payer: COMMERCIAL

## 2025-08-15 ENCOUNTER — OFFICE VISIT (OUTPATIENT)
Dept: CARDIOLOGY CLINIC | Facility: CLINIC | Age: 30
End: 2025-08-15
Attending: FAMILY MEDICINE
Payer: COMMERCIAL

## 2025-08-15 VITALS
BODY MASS INDEX: 26.09 KG/M2 | HEART RATE: 102 BPM | SYSTOLIC BLOOD PRESSURE: 102 MMHG | DIASTOLIC BLOOD PRESSURE: 60 MMHG | OXYGEN SATURATION: 97 % | WEIGHT: 152 LBS

## 2025-08-15 DIAGNOSIS — M79.18 MYOFASCIAL PAIN: ICD-10-CM

## 2025-08-15 DIAGNOSIS — R07.89 OTHER CHEST PAIN: Primary | ICD-10-CM

## 2025-08-15 DIAGNOSIS — E66.3 OVERWEIGHT WITH BODY MASS INDEX (BMI) OF 26 TO 26.9 IN ADULT: ICD-10-CM

## 2025-08-15 PROBLEM — R94.31 ABNORMAL ECG DURING EXERCISE STRESS TEST: Status: RESOLVED | Noted: 2025-04-09 | Resolved: 2025-08-15

## 2025-08-15 PROCEDURE — 99214 OFFICE O/P EST MOD 30 MIN: CPT | Performed by: STUDENT IN AN ORGANIZED HEALTH CARE EDUCATION/TRAINING PROGRAM
